# Patient Record
Sex: MALE | Race: WHITE | NOT HISPANIC OR LATINO
[De-identification: names, ages, dates, MRNs, and addresses within clinical notes are randomized per-mention and may not be internally consistent; named-entity substitution may affect disease eponyms.]

---

## 2019-01-28 ENCOUNTER — MEDICATION RENEWAL (OUTPATIENT)
Age: 69
End: 2019-01-28

## 2019-01-29 ENCOUNTER — OTHER (OUTPATIENT)
Age: 69
End: 2019-01-29

## 2019-02-15 ENCOUNTER — RECORD ABSTRACTING (OUTPATIENT)
Age: 69
End: 2019-02-15

## 2019-02-15 DIAGNOSIS — Z82.49 FAMILY HISTORY OF ISCHEMIC HEART DISEASE AND OTHER DISEASES OF THE CIRCULATORY SYSTEM: ICD-10-CM

## 2019-02-15 DIAGNOSIS — Z80.8 FAMILY HISTORY OF MALIGNANT NEOPLASM OF OTHER ORGANS OR SYSTEMS: ICD-10-CM

## 2019-02-15 RX ORDER — OMEGA-3-ACID ETHYL ESTERS CAPSULES 1 G/1
1 CAPSULE, LIQUID FILLED ORAL TWICE DAILY
Refills: 0 | Status: ACTIVE | COMMUNITY

## 2019-04-15 ENCOUNTER — MEDICATION RENEWAL (OUTPATIENT)
Age: 69
End: 2019-04-15

## 2019-08-28 ENCOUNTER — RX RENEWAL (OUTPATIENT)
Age: 69
End: 2019-08-28

## 2019-11-11 ENCOUNTER — OTHER (OUTPATIENT)
Age: 69
End: 2019-11-11

## 2019-11-22 ENCOUNTER — NON-APPOINTMENT (OUTPATIENT)
Age: 69
End: 2019-11-22

## 2019-11-22 ENCOUNTER — APPOINTMENT (OUTPATIENT)
Dept: INTERNAL MEDICINE | Facility: CLINIC | Age: 69
End: 2019-11-22
Payer: MEDICARE

## 2019-11-22 VITALS — BODY MASS INDEX: 27.85 KG/M2 | HEIGHT: 69 IN | WEIGHT: 188 LBS

## 2019-11-22 VITALS — SYSTOLIC BLOOD PRESSURE: 132 MMHG | DIASTOLIC BLOOD PRESSURE: 80 MMHG

## 2019-11-22 DIAGNOSIS — R73.09 OTHER ABNORMAL GLUCOSE: ICD-10-CM

## 2019-11-22 DIAGNOSIS — G25.0 ESSENTIAL TREMOR: ICD-10-CM

## 2019-11-22 LAB
25(OH)D3 SERPL-MCNC: 37.4 NG/ML
ALBUMIN SERPL ELPH-MCNC: 4.6 G/DL
ALP BLD-CCNC: 60 U/L
ALT SERPL-CCNC: 23 U/L
AMYLASE/CREAT SERPL: 65 U/L
ANION GAP SERPL CALC-SCNC: 14 MMOL/L
AST SERPL-CCNC: 27 U/L
BASOPHILS # BLD AUTO: 0.04 K/UL
BASOPHILS NFR BLD AUTO: 0.6 %
BILIRUB SERPL-MCNC: 0.3 MG/DL
BILIRUB UR QL STRIP: NEGATIVE
BUN SERPL-MCNC: 18 MG/DL
CALCIUM SERPL-MCNC: 9.7 MG/DL
CHLORIDE SERPL-SCNC: 104 MMOL/L
CHOLEST SERPL-MCNC: 171 MG/DL
CHOLEST/HDLC SERPL: 3 RATIO
CK SERPL-CCNC: 176 U/L
CO2 SERPL-SCNC: 25 MMOL/L
CREAT SERPL-MCNC: 0.95 MG/DL
EOSINOPHIL # BLD AUTO: 0.16 K/UL
EOSINOPHIL NFR BLD AUTO: 2.6 %
ESTIMATED AVERAGE GLUCOSE: 120 MG/DL
FERRITIN SERPL-MCNC: 410 NG/ML
GLUCOSE SERPL-MCNC: 115 MG/DL
GLUCOSE UR-MCNC: NEGATIVE
HBA1C MFR BLD HPLC: 5.8 %
HCG UR QL: 0.2 EU/DL
HCT VFR BLD CALC: 44.3 %
HDLC SERPL-MCNC: 58 MG/DL
HGB BLD-MCNC: 14.5 G/DL
HGB UR QL STRIP.AUTO: NEGATIVE
IMM GRANULOCYTES NFR BLD AUTO: 0.2 %
KETONES UR-MCNC: NEGATIVE
LDLC SERPL CALC-MCNC: 87 MG/DL
LEUKOCYTE ESTERASE UR QL STRIP: NEGATIVE
LPL SERPL-CCNC: 30 U/L
LYMPHOCYTES # BLD AUTO: 2.4 K/UL
LYMPHOCYTES NFR BLD AUTO: 38.5 %
MAN DIFF?: NORMAL
MCHC RBC-ENTMCNC: 31.7 PG
MCHC RBC-ENTMCNC: 32.7 GM/DL
MCV RBC AUTO: 96.9 FL
MONOCYTES # BLD AUTO: 0.72 K/UL
MONOCYTES NFR BLD AUTO: 11.6 %
NEUTROPHILS # BLD AUTO: 2.9 K/UL
NEUTROPHILS NFR BLD AUTO: 46.5 %
NITRITE UR QL STRIP: NEGATIVE
PH UR STRIP: 5.5
PLATELET # BLD AUTO: 257 K/UL
POTASSIUM SERPL-SCNC: 4.7 MMOL/L
PROT SERPL-MCNC: 6.9 G/DL
PROT UR STRIP-MCNC: NEGATIVE
PSA SERPL-MCNC: 1.31 NG/ML
RBC # BLD: 4.57 M/UL
RBC # FLD: 12.4 %
SODIUM SERPL-SCNC: 143 MMOL/L
SP GR UR STRIP: 1.03
T4 FREE SERPL-MCNC: 1.2 NG/DL
TRIGL SERPL-MCNC: 130 MG/DL
TSH SERPL-ACNC: 1.18 UIU/ML
VIT B12 SERPL-MCNC: 536 PG/ML
WBC # FLD AUTO: 6.23 K/UL

## 2019-11-22 PROCEDURE — G0444 DEPRESSION SCREEN ANNUAL: CPT | Mod: 59

## 2019-11-22 PROCEDURE — 93000 ELECTROCARDIOGRAM COMPLETE: CPT

## 2019-11-22 PROCEDURE — 90662 IIV NO PRSV INCREASED AG IM: CPT

## 2019-11-22 PROCEDURE — G0442 ANNUAL ALCOHOL SCREEN 15 MIN: CPT | Mod: 59

## 2019-11-22 PROCEDURE — 81003 URINALYSIS AUTO W/O SCOPE: CPT | Mod: QW

## 2019-11-22 PROCEDURE — G0439: CPT

## 2019-11-22 PROCEDURE — G0008: CPT

## 2019-11-22 NOTE — ASSESSMENT
[FreeTextEntry1] : athletic doing very well\par prediabetes better\par lipids good\par ferritin almost normal

## 2019-11-22 NOTE — REVIEW OF SYSTEMS
[Nocturia] : nocturia [Negative] : Heme/Lymph [Cough] : cough [Shortness Of Breath] : no shortness of breath [Wheezing] : no wheezing [Dyspnea on Exertion] : not dyspnea on exertion [Dysuria] : no dysuria [Incontinence] : no incontinence [Hesitancy] : no hesitancy [Hematuria] : no hematuria [Impotence] : no impotency [FreeTextEntry9] : hand pain gets injections

## 2019-11-22 NOTE — HEALTH RISK ASSESSMENT
[No] : No [Never (0 pts)] : Never (0 points) [0] : 2) Feeling down, depressed, or hopeless: Not at all (0) [Patient reported colonoscopy was normal] : Patient reported colonoscopy was normal [] : No [Audit-CScore] : 0 [[id number]] : 0 [ColonoscopyDate] : 3/2019

## 2019-11-22 NOTE — PHYSICAL EXAM
[No Acute Distress] : no acute distress [Well Nourished] : well nourished [Well Developed] : well developed [Well-Appearing] : well-appearing [Normal Sclera/Conjunctiva] : normal sclera/conjunctiva [PERRL] : pupils equal round and reactive to light [EOMI] : extraocular movements intact [Normal Outer Ear/Nose] : the outer ears and nose were normal in appearance [Normal Oropharynx] : the oropharynx was normal [No JVD] : no jugular venous distention [No Lymphadenopathy] : no lymphadenopathy [Supple] : supple [Thyroid Normal, No Nodules] : the thyroid was normal and there were no nodules present [No Respiratory Distress] : no respiratory distress  [No Accessory Muscle Use] : no accessory muscle use [Clear to Auscultation] : lungs were clear to auscultation bilaterally [Normal Rate] : normal rate  [Regular Rhythm] : with a regular rhythm [Normal S1, S2] : normal S1 and S2 [No Murmur] : no murmur heard [No Carotid Bruits] : no carotid bruits [No Abdominal Bruit] : a ~M bruit was not heard ~T in the abdomen [No Varicosities] : no varicosities [Pedal Pulses Present] : the pedal pulses are present [No Edema] : there was no peripheral edema [No Palpable Aorta] : no palpable aorta [No Extremity Clubbing/Cyanosis] : no extremity clubbing/cyanosis [Normal Appearance] : normal in appearance [No Nipple Discharge] : no nipple discharge [No Axillary Lymphadenopathy] : no axillary lymphadenopathy [Soft] : abdomen soft [Non Tender] : non-tender [Non-distended] : non-distended [No Masses] : no abdominal mass palpated [No HSM] : no HSM [Normal Bowel Sounds] : normal bowel sounds [Testes Mass (___cm)] : there were no testicular masses [Normal Posterior Cervical Nodes] : no posterior cervical lymphadenopathy [Normal Anterior Cervical Nodes] : no anterior cervical lymphadenopathy [No CVA Tenderness] : no CVA  tenderness [No Spinal Tenderness] : no spinal tenderness [No Joint Swelling] : no joint swelling [Grossly Normal Strength/Tone] : grossly normal strength/tone [No Rash] : no rash [Coordination Grossly Intact] : coordination grossly intact [No Focal Deficits] : no focal deficits [Normal Gait] : normal gait [Deep Tendon Reflexes (DTR)] : deep tendon reflexes were 2+ and symmetric [Normal Affect] : the affect was normal [Normal Insight/Judgement] : insight and judgment were intact

## 2019-11-22 NOTE — HISTORY OF PRESENT ILLNESS
[FreeTextEntry1] : here for complete exam [de-identified] : doing well\par no alcohol\par prediabetes better A1C 5.8\par regular GI visits [facility]\par active, swims, exercises regularly\par

## 2021-01-14 ENCOUNTER — APPOINTMENT (OUTPATIENT)
Dept: INTERNAL MEDICINE | Facility: CLINIC | Age: 71
End: 2021-01-14
Payer: MEDICARE

## 2021-01-14 ENCOUNTER — NON-APPOINTMENT (OUTPATIENT)
Age: 71
End: 2021-01-14

## 2021-01-14 VITALS
HEIGHT: 69 IN | DIASTOLIC BLOOD PRESSURE: 76 MMHG | BODY MASS INDEX: 26.96 KG/M2 | SYSTOLIC BLOOD PRESSURE: 120 MMHG | WEIGHT: 182 LBS

## 2021-01-14 DIAGNOSIS — E78.1 PURE HYPERGLYCERIDEMIA: ICD-10-CM

## 2021-01-14 DIAGNOSIS — Z23 ENCOUNTER FOR IMMUNIZATION: ICD-10-CM

## 2021-01-14 LAB
25(OH)D3 SERPL-MCNC: 37.7 NG/ML
ALBUMIN SERPL ELPH-MCNC: 4.7 G/DL
ALP BLD-CCNC: 61 U/L
ALT SERPL-CCNC: 23 U/L
AMYLASE/CREAT SERPL: 58 U/L
ANION GAP SERPL CALC-SCNC: 13 MMOL/L
AST SERPL-CCNC: 25 U/L
BASOPHILS # BLD AUTO: 0.03 K/UL
BASOPHILS NFR BLD AUTO: 0.5 %
BILIRUB SERPL-MCNC: 0.3 MG/DL
BILIRUB UR QL STRIP: NEGATIVE
BUN SERPL-MCNC: 17 MG/DL
CALCIUM SERPL-MCNC: 9.2 MG/DL
CHLORIDE SERPL-SCNC: 105 MMOL/L
CHOLEST SERPL-MCNC: 160 MG/DL
CK SERPL-CCNC: 181 U/L
CLARITY UR: CLEAR
CO2 SERPL-SCNC: 24 MMOL/L
COLLECTION METHOD: NORMAL
CREAT SERPL-MCNC: 0.85 MG/DL
EOSINOPHIL # BLD AUTO: 0.13 K/UL
EOSINOPHIL NFR BLD AUTO: 2 %
ESTIMATED AVERAGE GLUCOSE: 120 MG/DL
FERRITIN SERPL-MCNC: 356 NG/ML
GLUCOSE SERPL-MCNC: 133 MG/DL
GLUCOSE UR-MCNC: NEGATIVE
HBA1C MFR BLD HPLC: 5.8 %
HCG UR QL: NORMAL EU/DL
HCT VFR BLD CALC: 42.4 %
HDLC SERPL-MCNC: 56 MG/DL
HGB BLD-MCNC: 14 G/DL
HGB UR QL STRIP.AUTO: NEGATIVE
IMM GRANULOCYTES NFR BLD AUTO: 0.3 %
KETONES UR-MCNC: NEGATIVE
LDLC SERPL CALC-MCNC: 89 MG/DL
LEUKOCYTE ESTERASE UR QL STRIP: NORMAL
LPL SERPL-CCNC: 26 U/L
LYMPHOCYTES # BLD AUTO: 2.37 K/UL
LYMPHOCYTES NFR BLD AUTO: 36.4 %
MAN DIFF?: NORMAL
MCHC RBC-ENTMCNC: 30.9 PG
MCHC RBC-ENTMCNC: 33 GM/DL
MCV RBC AUTO: 93.6 FL
MONOCYTES # BLD AUTO: 0.57 K/UL
MONOCYTES NFR BLD AUTO: 8.8 %
NEUTROPHILS # BLD AUTO: 3.39 K/UL
NEUTROPHILS NFR BLD AUTO: 52 %
NITRITE UR QL STRIP: NEGATIVE
NONHDLC SERPL-MCNC: 104 MG/DL
PH UR STRIP: 6
PLATELET # BLD AUTO: 258 K/UL
POTASSIUM SERPL-SCNC: 4.4 MMOL/L
PROT SERPL-MCNC: 6.7 G/DL
PROT UR STRIP-MCNC: NEGATIVE
PSA SERPL-MCNC: 1.04 NG/ML
RBC # BLD: 4.53 M/UL
RBC # FLD: 12.2 %
SODIUM SERPL-SCNC: 141 MMOL/L
SP GR UR STRIP: 1.02
T4 FREE SERPL-MCNC: 1.2 NG/DL
TRIGL SERPL-MCNC: 73 MG/DL
TSH SERPL-ACNC: 1.25 UIU/ML
VIT B12 SERPL-MCNC: 510 PG/ML
WBC # FLD AUTO: 6.51 K/UL

## 2021-01-14 PROCEDURE — G0009: CPT

## 2021-01-14 PROCEDURE — 93000 ELECTROCARDIOGRAM COMPLETE: CPT | Mod: 59

## 2021-01-14 PROCEDURE — 82270 OCCULT BLOOD FECES: CPT

## 2021-01-14 PROCEDURE — 90732 PPSV23 VACC 2 YRS+ SUBQ/IM: CPT

## 2021-01-14 PROCEDURE — 81003 URINALYSIS AUTO W/O SCOPE: CPT | Mod: QW

## 2021-01-14 PROCEDURE — G0444 DEPRESSION SCREEN ANNUAL: CPT | Mod: 59

## 2021-01-14 PROCEDURE — G0439: CPT

## 2021-01-14 NOTE — ASSESSMENT
[FreeTextEntry1] : athletic doing very well\par prediabetes stable 5.8\par lipids very good\par ferritin normal\par LFT's normal

## 2021-01-14 NOTE — PHYSICAL EXAM
[Well Nourished] : well nourished [Well Developed] : well developed [Well-Appearing] : well-appearing [Normal Sclera/Conjunctiva] : normal sclera/conjunctiva [PERRL] : pupils equal round and reactive to light [EOMI] : extraocular movements intact [Normal Outer Ear/Nose] : the outer ears and nose were normal in appearance [Normal Oropharynx] : the oropharynx was normal [No JVD] : no jugular venous distention [No Lymphadenopathy] : no lymphadenopathy [Supple] : supple [Thyroid Normal, No Nodules] : the thyroid was normal and there were no nodules present [No Respiratory Distress] : no respiratory distress  [No Accessory Muscle Use] : no accessory muscle use [Clear to Auscultation] : lungs were clear to auscultation bilaterally [Normal Rate] : normal rate  [Regular Rhythm] : with a regular rhythm [Normal S1, S2] : normal S1 and S2 [No Murmur] : no murmur heard [No Carotid Bruits] : no carotid bruits [No Abdominal Bruit] : a ~M bruit was not heard ~T in the abdomen [No Varicosities] : no varicosities [Pedal Pulses Present] : the pedal pulses are present [No Edema] : there was no peripheral edema [No Palpable Aorta] : no palpable aorta [No Extremity Clubbing/Cyanosis] : no extremity clubbing/cyanosis [Normal Appearance] : normal in appearance [No Nipple Discharge] : no nipple discharge [No Axillary Lymphadenopathy] : no axillary lymphadenopathy [Soft] : abdomen soft [Non Tender] : non-tender [Non-distended] : non-distended [No Masses] : no abdominal mass palpated [No HSM] : no HSM [Normal Bowel Sounds] : normal bowel sounds [Testes Mass (___cm)] : there were no testicular masses [Normal Posterior Cervical Nodes] : no posterior cervical lymphadenopathy [Normal Anterior Cervical Nodes] : no anterior cervical lymphadenopathy [No CVA Tenderness] : no CVA  tenderness [No Spinal Tenderness] : no spinal tenderness [No Joint Swelling] : no joint swelling [Grossly Normal Strength/Tone] : grossly normal strength/tone [No Rash] : no rash [Coordination Grossly Intact] : coordination grossly intact [No Focal Deficits] : no focal deficits [Normal Gait] : normal gait [Deep Tendon Reflexes (DTR)] : deep tendon reflexes were 2+ and symmetric [Normal Affect] : the affect was normal [Normal Insight/Judgement] : insight and judgment were intact [No Mass] : no mass [Stool Occult Blood] : stool negative for occult blood

## 2021-01-14 NOTE — REVIEW OF SYSTEMS
[Nocturia] : nocturia [Negative] : Heme/Lymph [Shortness Of Breath] : no shortness of breath [Wheezing] : no wheezing [Cough] : no cough [Dyspnea on Exertion] : not dyspnea on exertion [Dysuria] : no dysuria [Incontinence] : no incontinence [Hesitancy] : no hesitancy [Hematuria] : no hematuria [Impotence] : no impotency [FreeTextEntry8] : stable [FreeTextEntry9] : hand pain gets injections

## 2021-01-14 NOTE — HEALTH RISK ASSESSMENT
[No] : No [Never (0 pts)] : Never (0 points) [0] : 2) Feeling down, depressed, or hopeless: Not at all (0) [Patient reported colonoscopy was normal] : Patient reported colonoscopy was normal [] : No [Audit-CScore] : 0 [[id number]] : 0 [ColonoscopyDate] : 3/2019 [ColonoscopyComments] : [facility]

## 2021-01-14 NOTE — HISTORY OF PRESENT ILLNESS
[FreeTextEntry1] : here for complete exam [de-identified] : doing well\par no alcohol\par prediabetes stable A1C 5.8\par regular GI visits [facility]\par cervical disc episode better after epidural\par active, still swims, exercises regularly without issues\par regular derm, recent squamous cell scalp removed, [facility]\par

## 2021-05-03 ENCOUNTER — APPOINTMENT (OUTPATIENT)
Dept: INTERNAL MEDICINE | Facility: CLINIC | Age: 71
End: 2021-05-03
Payer: MEDICARE

## 2021-05-03 ENCOUNTER — NON-APPOINTMENT (OUTPATIENT)
Age: 71
End: 2021-05-03

## 2021-05-03 VITALS — BODY MASS INDEX: 27.47 KG/M2 | WEIGHT: 186 LBS

## 2021-05-03 VITALS — HEIGHT: 69 IN | BODY MASS INDEX: 27.47 KG/M2 | SYSTOLIC BLOOD PRESSURE: 130 MMHG | DIASTOLIC BLOOD PRESSURE: 78 MMHG

## 2021-05-03 DIAGNOSIS — Z01.818 ENCOUNTER FOR OTHER PREPROCEDURAL EXAMINATION: ICD-10-CM

## 2021-05-03 DIAGNOSIS — H26.9 UNSPECIFIED CATARACT: ICD-10-CM

## 2021-05-03 PROCEDURE — 93000 ELECTROCARDIOGRAM COMPLETE: CPT

## 2021-05-03 PROCEDURE — 99213 OFFICE O/P EST LOW 20 MIN: CPT | Mod: 25

## 2021-05-03 NOTE — PHYSICAL EXAM
[Well Nourished] : well nourished [Well Developed] : well developed [Well-Appearing] : well-appearing [Normal Sclera/Conjunctiva] : normal sclera/conjunctiva [PERRL] : pupils equal round and reactive to light [EOMI] : extraocular movements intact [Normal Outer Ear/Nose] : the outer ears and nose were normal in appearance [Normal Oropharynx] : the oropharynx was normal [No JVD] : no jugular venous distention [No Lymphadenopathy] : no lymphadenopathy [Supple] : supple [Thyroid Normal, No Nodules] : the thyroid was normal and there were no nodules present [No Respiratory Distress] : no respiratory distress  [No Accessory Muscle Use] : no accessory muscle use [Clear to Auscultation] : lungs were clear to auscultation bilaterally [Normal Rate] : normal rate  [Regular Rhythm] : with a regular rhythm [Normal S1, S2] : normal S1 and S2 [No Murmur] : no murmur heard [No Carotid Bruits] : no carotid bruits [No Abdominal Bruit] : a ~M bruit was not heard ~T in the abdomen [No Varicosities] : no varicosities [Pedal Pulses Present] : the pedal pulses are present [No Edema] : there was no peripheral edema [No Palpable Aorta] : no palpable aorta [No Extremity Clubbing/Cyanosis] : no extremity clubbing/cyanosis [Normal Appearance] : normal in appearance [No Nipple Discharge] : no nipple discharge [No Axillary Lymphadenopathy] : no axillary lymphadenopathy [Soft] : abdomen soft [Non Tender] : non-tender [Non-distended] : non-distended [No Masses] : no abdominal mass palpated [No HSM] : no HSM [Normal Bowel Sounds] : normal bowel sounds [Normal Posterior Cervical Nodes] : no posterior cervical lymphadenopathy [Normal Anterior Cervical Nodes] : no anterior cervical lymphadenopathy [No CVA Tenderness] : no CVA  tenderness [No Spinal Tenderness] : no spinal tenderness [No Joint Swelling] : no joint swelling [Grossly Normal Strength/Tone] : grossly normal strength/tone [No Rash] : no rash [Coordination Grossly Intact] : coordination grossly intact [No Focal Deficits] : no focal deficits [Normal Gait] : normal gait [Normal Affect] : the affect was normal [Normal Insight/Judgement] : insight and judgment were intact

## 2021-05-03 NOTE — HISTORY OF PRESENT ILLNESS
[No Adverse Anesthesia Reaction] : no adverse anesthesia reaction in self or family member [(Patient denies any chest pain, claudication, dyspnea on exertion, orthopnea, palpitations or syncope)] : Patient denies any chest pain, claudication, dyspnea on exertion, orthopnea, palpitations or syncope [Excellent (>10 METs)] : Excellent (>10 METs) [Aortic Stenosis] : no aortic stenosis [Atrial Fibrillation] : no atrial fibrillation [Coronary Artery Disease] : no coronary artery disease [Recent Myocardial Infarction] : no recent myocardial infarction [Implantable Device/Pacemaker] : no implantable device/pacemaker [Asthma] : no asthma [COPD] : no COPD [Sleep Apnea] : no sleep apnea [Smoker] : not a smoker [Chronic Anticoagulation] : no chronic anticoagulation [Chronic Kidney Disease] : no chronic kidney disease [Diabetes] : no diabetes [FreeTextEntry1] : cataract extraction and treatment of astigmatism and insertion of IOL [FreeTextEntry2] : 5/20/2021 [FreeTextEntry3] : Kirill Cardenas [FreeTextEntry4] : borderline prediabetes\par cervical disc degeneration\par visual problems right eye from cataract

## 2021-05-03 NOTE — REVIEW OF SYSTEMS
[Nocturia] : nocturia [Negative] : Heme/Lymph [Shortness Of Breath] : no shortness of breath [Wheezing] : no wheezing [Cough] : no cough [Dyspnea on Exertion] : not dyspnea on exertion [Dysuria] : no dysuria [Incontinence] : no incontinence [Hesitancy] : no hesitancy [Hematuria] : no hematuria [Impotence] : no impotency [FreeTextEntry8] : stable

## 2021-05-03 NOTE — ASSESSMENT
[Patient Optimized for Surgery] : Patient optimized for surgery [No Further Testing Recommended] : no further testing recommended [Continue medications as is] : Continue current medications [FreeTextEntry4] : stable for low risk surgery and anesthesia at very low risk

## 2021-05-16 ENCOUNTER — TRANSCRIPTION ENCOUNTER (OUTPATIENT)
Age: 71
End: 2021-05-16

## 2021-10-18 ENCOUNTER — APPOINTMENT (OUTPATIENT)
Dept: UROLOGY | Facility: CLINIC | Age: 71
End: 2021-10-18
Payer: MEDICARE

## 2021-10-18 VITALS
BODY MASS INDEX: 26.66 KG/M2 | DIASTOLIC BLOOD PRESSURE: 76 MMHG | RESPIRATION RATE: 16 BRPM | HEART RATE: 54 BPM | HEIGHT: 69 IN | TEMPERATURE: 97.5 F | WEIGHT: 180 LBS | SYSTOLIC BLOOD PRESSURE: 132 MMHG

## 2021-10-18 PROCEDURE — 99203 OFFICE O/P NEW LOW 30 MIN: CPT

## 2021-10-18 NOTE — PHYSICAL EXAM
[General Appearance - Well Developed] : well developed [General Appearance - Well Nourished] : well nourished [Heart Rate And Rhythm] : Heart rate and rhythm were normal [] : no respiratory distress [Respiration, Rhythm And Depth] : normal respiratory rhythm and effort [Bowel Sounds] : normal bowel sounds [Abdomen Soft] : soft [Urethral Meatus] : meatus normal [Normal Station and Gait] : the gait and station were normal for the patient's age [Skin Color & Pigmentation] : normal skin color and pigmentation [Skin Turgor] : supple [No Focal Deficits] : no focal deficits [Oriented To Time, Place, And Person] : oriented to person, place, and time [Not Anxious] : not anxious

## 2021-10-18 NOTE — HISTORY OF PRESENT ILLNESS
[FreeTextEntry1] : HPI: Mr. Esquivel is a 71 yo M here today with a gross hematuria x1 after Saturday. No weight loss. Smokes marijuana every day for the last 50 years. No back pain. Frequent swimmer, in good physical shape. No urinary complaints, occasionally having some urgency. Nocturia x 1. Good erections.\par \par Anticoagulation: No AC\par All: NKDA\par Social: MJ smoker, , children\par PMHx: no HTN, DM or HLD\par FHx: father was prior smoker\par PSHx: torn meniscus, trigger finger, epidurals for neck and lower back\par Labs: PSA 1.04 1/2021\par \par \par  [Urinary Incontinence] : no urinary incontinence [Urinary Retention] : no urinary retention [Urinary Urgency] : no urinary urgency [Urinary Frequency] : no urinary frequency

## 2021-10-19 LAB
APPEARANCE: CLEAR
BACTERIA: NEGATIVE
BILIRUBIN URINE: NEGATIVE
BLOOD URINE: NEGATIVE
COLOR: NORMAL
GLUCOSE QUALITATIVE U: NEGATIVE
HYALINE CASTS: 0 /LPF
KETONES URINE: NEGATIVE
LEUKOCYTE ESTERASE URINE: NEGATIVE
MICROSCOPIC-UA: NORMAL
NITRITE URINE: NEGATIVE
PH URINE: 6.5
PROTEIN URINE: NEGATIVE
RED BLOOD CELLS URINE: 0 /HPF
SPECIFIC GRAVITY URINE: 1.01
SQUAMOUS EPITHELIAL CELLS: 0 /HPF
UROBILINOGEN URINE: NORMAL
WHITE BLOOD CELLS URINE: 0 /HPF

## 2021-10-20 LAB
BACTERIA UR CULT: NORMAL
URINE CYTOLOGY: NORMAL

## 2021-10-26 ENCOUNTER — TRANSCRIPTION ENCOUNTER (OUTPATIENT)
Age: 71
End: 2021-10-26

## 2021-10-27 ENCOUNTER — APPOINTMENT (OUTPATIENT)
Dept: CT IMAGING | Facility: CLINIC | Age: 71
End: 2021-10-27
Payer: MEDICARE

## 2021-10-27 ENCOUNTER — OUTPATIENT (OUTPATIENT)
Dept: OUTPATIENT SERVICES | Facility: HOSPITAL | Age: 71
LOS: 1 days | End: 2021-10-27
Payer: MEDICARE

## 2021-10-27 DIAGNOSIS — R31.0 GROSS HEMATURIA: ICD-10-CM

## 2021-10-27 PROCEDURE — G1004: CPT

## 2021-10-27 PROCEDURE — 74178 CT ABD&PLV WO CNTR FLWD CNTR: CPT | Mod: 26,MG

## 2021-10-27 PROCEDURE — 74178 CT ABD&PLV WO CNTR FLWD CNTR: CPT | Mod: MG

## 2021-11-02 ENCOUNTER — OUTPATIENT (OUTPATIENT)
Dept: OUTPATIENT SERVICES | Facility: HOSPITAL | Age: 71
LOS: 1 days | End: 2021-11-02
Payer: MEDICARE

## 2021-11-02 ENCOUNTER — APPOINTMENT (OUTPATIENT)
Dept: UROLOGY | Facility: CLINIC | Age: 71
End: 2021-11-02
Payer: MEDICARE

## 2021-11-02 VITALS
SYSTOLIC BLOOD PRESSURE: 169 MMHG | HEART RATE: 53 BPM | TEMPERATURE: 98 F | DIASTOLIC BLOOD PRESSURE: 82 MMHG | RESPIRATION RATE: 17 BRPM

## 2021-11-02 DIAGNOSIS — Z87.448 PERSONAL HISTORY OF OTHER DISEASES OF URINARY SYSTEM: ICD-10-CM

## 2021-11-02 DIAGNOSIS — R35.0 FREQUENCY OF MICTURITION: ICD-10-CM

## 2021-11-02 PROCEDURE — 52000 CYSTOURETHROSCOPY: CPT

## 2022-01-18 ENCOUNTER — APPOINTMENT (OUTPATIENT)
Dept: UROLOGY | Facility: CLINIC | Age: 72
End: 2022-01-18
Payer: MEDICARE

## 2022-01-18 DIAGNOSIS — R35.0 FREQUENCY OF MICTURITION: ICD-10-CM

## 2022-01-18 PROCEDURE — 99212 OFFICE O/P EST SF 10 MIN: CPT

## 2022-01-18 PROCEDURE — 88112 CYTOPATH CELL ENHANCE TECH: CPT | Mod: 26

## 2022-01-18 NOTE — PHYSICAL EXAM
[General Appearance - Well Developed] : well developed [General Appearance - Well Nourished] : well nourished [Bowel Sounds] : normal bowel sounds [Abdomen Soft] : soft [Urethral Meatus] : meatus normal [Skin Color & Pigmentation] : normal skin color and pigmentation [Skin Turgor] : supple [Heart Rate And Rhythm] : Heart rate and rhythm were normal [] : no respiratory distress [Respiration, Rhythm And Depth] : normal respiratory rhythm and effort [Oriented To Time, Place, And Person] : oriented to person, place, and time [Not Anxious] : not anxious [Normal Station and Gait] : the gait and station were normal for the patient's age [No Focal Deficits] : no focal deficits

## 2022-01-23 ENCOUNTER — TRANSCRIPTION ENCOUNTER (OUTPATIENT)
Age: 72
End: 2022-01-23

## 2022-01-23 LAB
APPEARANCE: CLEAR
BACTERIA: NEGATIVE
BILIRUBIN URINE: NEGATIVE
BLOOD URINE: NEGATIVE
COLOR: NORMAL
GLUCOSE QUALITATIVE U: ABNORMAL
HYALINE CASTS: 0 /LPF
KETONES URINE: NEGATIVE
LEUKOCYTE ESTERASE URINE: NEGATIVE
MICROSCOPIC-UA: NORMAL
NITRITE URINE: NEGATIVE
PH URINE: 6.5
PROTEIN URINE: NEGATIVE
RED BLOOD CELLS URINE: 0 /HPF
SPECIFIC GRAVITY URINE: 1.01
SQUAMOUS EPITHELIAL CELLS: 0 /HPF
URINE CYTOLOGY: NORMAL
UROBILINOGEN URINE: NORMAL
WHITE BLOOD CELLS URINE: 1 /HPF

## 2022-01-23 NOTE — HISTORY OF PRESENT ILLNESS
[Urinary Frequency] : urinary frequency [Weak Stream] : weak stream [FreeTextEntry1] : HPI: Mr. Esquivel is a 71 yo M here today with a gross hematuria x1 after Saturday. No weight loss. Smokes marijuana every day for the last 50 years. No back pain. Frequent swimmer, in good physical shape. No urinary complaints, occasionally having some urgency. Nocturia x 1. Good erections.\par \par Anticoagulation: No AC\par All: NKDA\par Social: MJ smoker, , children\par PMHx: no HTN, DM or HLD\par FHx: father was prior smoker\par PSHx: torn meniscus, trigger finger, epidurals for neck and lower back\par Labs: PSA 1.04 1/2021\par \par 1/18:\par Here today after negative hematuria workup. Cystoscopy with trilobar hyperplasia. CTU with punctate nonobstructing stones. PVR today 86 cc. Still has frequency from time to time but does have some valsava voiding with pushing. PSA screening with PCP Dr. Yang.\par \par \par  [Urinary Incontinence] : no urinary incontinence [Urinary Retention] : no urinary retention [Urinary Urgency] : no urinary urgency

## 2022-02-02 ENCOUNTER — APPOINTMENT (OUTPATIENT)
Dept: UROLOGY | Facility: CLINIC | Age: 72
End: 2022-02-02

## 2022-02-15 ENCOUNTER — APPOINTMENT (OUTPATIENT)
Dept: UROLOGY | Facility: CLINIC | Age: 72
End: 2022-02-15
Payer: MEDICARE

## 2022-02-15 PROCEDURE — 99212 OFFICE O/P EST SF 10 MIN: CPT

## 2022-02-15 PROCEDURE — 51798 US URINE CAPACITY MEASURE: CPT

## 2022-02-15 NOTE — HISTORY OF PRESENT ILLNESS
[FreeTextEntry1] : HPI: Mr. Esquivel is a 71 yo M here today with a gross hematuria x1 after Saturday. No weight loss. Smokes marijuana every day for the last 50 years. No back pain. Frequent swimmer, in good physical shape. No urinary complaints, occasionally having some urgency. Nocturia x 1. Good erections.\par \par Anticoagulation: No AC\par All: NKDA\par Social: MJ smoker, , children\par PMHx: no HTN, DM or HLD\par FHx: father was prior smoker\par PSHx: torn meniscus, trigger finger, epidurals for neck and lower back\par Labs: PSA 1.04 1/2021\par \par 1/18:\par Here today after negative hematuria workup. Cystoscopy with trilobar hyperplasia. CTU with punctate nonobstructing stones. PVR today 86 cc. Still has frequency from time to time but does have some valsava voiding with pushing. PSA screening with PCP Dr. Yang.\par \par 2/15:\par Here today with flomax, doing well, PVR today 34 cc. CTU with nonoobstructing stones and enlarged prostate. PSA to be done with PCP. Doing well, emptying well with strong stream. \par \par  [Urinary Incontinence] : no urinary incontinence [Urinary Retention] : no urinary retention [Urinary Urgency] : no urinary urgency [Urinary Frequency] : no urinary frequency [Weak Stream] : no weak stream

## 2022-03-09 ENCOUNTER — NON-APPOINTMENT (OUTPATIENT)
Age: 72
End: 2022-03-09

## 2022-03-09 ENCOUNTER — APPOINTMENT (OUTPATIENT)
Dept: INTERNAL MEDICINE | Facility: CLINIC | Age: 72
End: 2022-03-09
Payer: MEDICARE

## 2022-03-09 VITALS
TEMPERATURE: 97.3 F | SYSTOLIC BLOOD PRESSURE: 126 MMHG | BODY MASS INDEX: 27.99 KG/M2 | WEIGHT: 189 LBS | HEIGHT: 69 IN | HEART RATE: 59 BPM | DIASTOLIC BLOOD PRESSURE: 70 MMHG | OXYGEN SATURATION: 99 %

## 2022-03-09 VITALS — SYSTOLIC BLOOD PRESSURE: 132 MMHG | DIASTOLIC BLOOD PRESSURE: 80 MMHG

## 2022-03-09 DIAGNOSIS — R79.89 OTHER SPECIFIED ABNORMAL FINDINGS OF BLOOD CHEMISTRY: ICD-10-CM

## 2022-03-09 DIAGNOSIS — R05.3 CHRONIC COUGH: ICD-10-CM

## 2022-03-09 DIAGNOSIS — R31.0 GROSS HEMATURIA: ICD-10-CM

## 2022-03-09 DIAGNOSIS — K76.0 FATTY (CHANGE OF) LIVER, NOT ELSEWHERE CLASSIFIED: ICD-10-CM

## 2022-03-09 LAB
ALBUMIN SERPL ELPH-MCNC: 4.5 G/DL
ALP BLD-CCNC: 55 U/L
ALT SERPL-CCNC: 22 U/L
AMYLASE/CREAT SERPL: 52 U/L
ANION GAP SERPL CALC-SCNC: 12 MMOL/L
AST SERPL-CCNC: 23 U/L
BASOPHILS # BLD AUTO: 0.03 K/UL
BASOPHILS NFR BLD AUTO: 0.5 %
BILIRUB SERPL-MCNC: 0.3 MG/DL
BUN SERPL-MCNC: 13 MG/DL
CALCIUM SERPL-MCNC: 9.1 MG/DL
CHLORIDE SERPL-SCNC: 105 MMOL/L
CHOLEST SERPL-MCNC: 158 MG/DL
CK SERPL-CCNC: 188 U/L
CO2 SERPL-SCNC: 24 MMOL/L
CREAT SERPL-MCNC: 0.82 MG/DL
EGFR: 94 ML/MIN/1.73M2
EOSINOPHIL # BLD AUTO: 0.15 K/UL
EOSINOPHIL NFR BLD AUTO: 2.4 %
ESTIMATED AVERAGE GLUCOSE: 123 MG/DL
GLUCOSE SERPL-MCNC: 121 MG/DL
HBA1C MFR BLD HPLC: 5.9 %
HCT VFR BLD CALC: 41.5 %
HDLC SERPL-MCNC: 55 MG/DL
HGB BLD-MCNC: 13.6 G/DL
IMM GRANULOCYTES NFR BLD AUTO: 0.3 %
LDLC SERPL CALC-MCNC: 89 MG/DL
LPL SERPL-CCNC: 27 U/L
LYMPHOCYTES # BLD AUTO: 2.59 K/UL
LYMPHOCYTES NFR BLD AUTO: 41.1 %
MAN DIFF?: NORMAL
MCHC RBC-ENTMCNC: 31.5 PG
MCHC RBC-ENTMCNC: 32.8 GM/DL
MCV RBC AUTO: 96.1 FL
MONOCYTES # BLD AUTO: 0.55 K/UL
MONOCYTES NFR BLD AUTO: 8.7 %
NEUTROPHILS # BLD AUTO: 2.96 K/UL
NEUTROPHILS NFR BLD AUTO: 47 %
NONHDLC SERPL-MCNC: 103 MG/DL
PLATELET # BLD AUTO: 242 K/UL
POTASSIUM SERPL-SCNC: 4.7 MMOL/L
PROT SERPL-MCNC: 6.6 G/DL
PSA SERPL-MCNC: 0.93 NG/ML
RBC # BLD: 4.32 M/UL
RBC # FLD: 12.4 %
SODIUM SERPL-SCNC: 141 MMOL/L
T4 FREE SERPL-MCNC: 1.4 NG/DL
TRIGL SERPL-MCNC: 68 MG/DL
TSH SERPL-ACNC: 1.18 UIU/ML
VIT B12 SERPL-MCNC: 526 PG/ML
WBC # FLD AUTO: 6.3 K/UL

## 2022-03-09 PROCEDURE — G0444 DEPRESSION SCREEN ANNUAL: CPT

## 2022-03-09 PROCEDURE — 81003 URINALYSIS AUTO W/O SCOPE: CPT | Mod: QW

## 2022-03-09 PROCEDURE — 36415 COLL VENOUS BLD VENIPUNCTURE: CPT

## 2022-03-09 PROCEDURE — 93000 ELECTROCARDIOGRAM COMPLETE: CPT | Mod: 59

## 2022-03-09 PROCEDURE — G0439: CPT

## 2022-03-09 NOTE — REVIEW OF SYSTEMS
[Nocturia] : nocturia [Negative] : Heme/Lymph [Cough] : cough [Shortness Of Breath] : no shortness of breath [Wheezing] : no wheezing [Dyspnea on Exertion] : not dyspnea on exertion [Dysuria] : no dysuria [Incontinence] : no incontinence [Hesitancy] : no hesitancy [Hematuria] : no hematuria [Impotence] : no impotency [FreeTextEntry6] : on and off [FreeTextEntry8] : stable

## 2022-03-09 NOTE — PHYSICAL EXAM
[Well Nourished] : well nourished [Well Developed] : well developed [Well-Appearing] : well-appearing [Normal Sclera/Conjunctiva] : normal sclera/conjunctiva [PERRL] : pupils equal round and reactive to light [EOMI] : extraocular movements intact [Normal Outer Ear/Nose] : the outer ears and nose were normal in appearance [Normal Oropharynx] : the oropharynx was normal [No JVD] : no jugular venous distention [No Lymphadenopathy] : no lymphadenopathy [Supple] : supple [Thyroid Normal, No Nodules] : the thyroid was normal and there were no nodules present [No Respiratory Distress] : no respiratory distress  [No Accessory Muscle Use] : no accessory muscle use [Clear to Auscultation] : lungs were clear to auscultation bilaterally [Normal Rate] : normal rate  [Regular Rhythm] : with a regular rhythm [Normal S1, S2] : normal S1 and S2 [No Murmur] : no murmur heard [No Carotid Bruits] : no carotid bruits [No Abdominal Bruit] : a ~M bruit was not heard ~T in the abdomen [No Varicosities] : no varicosities [Pedal Pulses Present] : the pedal pulses are present [No Edema] : there was no peripheral edema [No Palpable Aorta] : no palpable aorta [No Extremity Clubbing/Cyanosis] : no extremity clubbing/cyanosis [Normal Appearance] : normal in appearance [No Nipple Discharge] : no nipple discharge [No Axillary Lymphadenopathy] : no axillary lymphadenopathy [Soft] : abdomen soft [Non Tender] : non-tender [Non-distended] : non-distended [No Masses] : no abdominal mass palpated [No HSM] : no HSM [Normal Bowel Sounds] : normal bowel sounds [Normal Posterior Cervical Nodes] : no posterior cervical lymphadenopathy [Normal Anterior Cervical Nodes] : no anterior cervical lymphadenopathy [No CVA Tenderness] : no CVA  tenderness [No Spinal Tenderness] : no spinal tenderness [No Joint Swelling] : no joint swelling [Grossly Normal Strength/Tone] : grossly normal strength/tone [No Rash] : no rash [Coordination Grossly Intact] : coordination grossly intact [No Focal Deficits] : no focal deficits [Normal Gait] : normal gait [Normal Affect] : the affect was normal [Normal Insight/Judgement] : insight and judgment were intact [No Stool to Guaiac] : no stool to guaiac [Normal Sphincter Tone] : normal sphincter tone [No Mass] : no mass [Testes Mass (___cm)] : there were no testicular masses [No Prostate Nodules] : no prostate nodules [Stool Occult Blood] : stool negative for occult blood [de-identified] : cerumen impaction left

## 2022-03-09 NOTE — HISTORY OF PRESENT ILLNESS
[FreeTextEntry1] : here for complete exam [de-identified] : doing well\par no alcohol\par prediabetes stable A1C 5.9\par regular GI visits [facility]\par cervical disc episode better after epidural\par also had lower back epidurals\par active, still swims, exercises regularly without issues\par no issues with exercise\par regular derm [facility]\par sees urologist Jacky Zamora after gross hematuria\par vaccinated and boosted Pfizer\par PVR 86, after tamsulosin went down to 32\par CT coronary calcium score 34 lungs were ok 2/2021

## 2022-03-09 NOTE — HEALTH RISK ASSESSMENT
[Never] : Never [No] : No [Never (0 pts)] : Never (0 points) [0] : 2) Feeling down, depressed, or hopeless: Not at all (0) [PHQ-2 Negative - No further assessment needed] : PHQ-2 Negative - No further assessment needed [Patient reported colonoscopy was normal] : Patient reported colonoscopy was normal [Audit-CScore] : 0 [[id number]] : 0 [ColonoscopyDate] : 3/2019 [ColonoscopyComments] : [facility]

## 2022-03-09 NOTE — ASSESSMENT
[FreeTextEntry1] : athletic doing very well\par prediabetes stable 5.9\par lipids very good\par LFT's normal\par persistent mild cough

## 2022-03-17 ENCOUNTER — TRANSCRIPTION ENCOUNTER (OUTPATIENT)
Age: 72
End: 2022-03-17

## 2022-10-18 ENCOUNTER — APPOINTMENT (OUTPATIENT)
Dept: ORTHOPEDIC SURGERY | Facility: CLINIC | Age: 72
End: 2022-10-18

## 2022-10-18 VITALS — HEIGHT: 69 IN | BODY MASS INDEX: 27.4 KG/M2 | WEIGHT: 185 LBS

## 2022-10-18 DIAGNOSIS — M25.521 PAIN IN RIGHT ELBOW: ICD-10-CM

## 2022-10-18 DIAGNOSIS — M25.729 OSTEOPHYTE, UNSPECIFIED ELBOW: ICD-10-CM

## 2022-10-18 PROCEDURE — 73080 X-RAY EXAM OF ELBOW: CPT | Mod: RT

## 2022-10-18 PROCEDURE — 99213 OFFICE O/P EST LOW 20 MIN: CPT

## 2022-10-18 NOTE — PHYSICAL EXAM
[NL (150)] : flexion 150 degrees [NL (0)] : extension 0 degrees [NL (90)] : supination 90 degrees [5___] : supination 5[unfilled]/5 [] : light touch intact [Right] : right elbow [Degenerative change] : Degenerative change

## 2022-10-18 NOTE — DISCUSSION/SUMMARY
[Medication Risks Reviewed] : Medication risks reviewed [Surgical risks reviewed] : Surgical risks reviewed [de-identified] : elbow appears great and patient has been going exercies on his own. encouraged him to continue\par prescribed nsaids and discussed risks of side effects and timing and management of medication.  side effects can include gi ulcers and irritation as well as kidney failure and bleeding issues\par

## 2022-10-18 NOTE — HISTORY OF PRESENT ILLNESS
[de-identified] : patient was getting out of bed tow weeks ago  and used the  right elbow  as support . pt had bursitis in  the  right elbow. pt  feels tenderness in the right elbow. pt feels discomfort in the right elbow. pt had the  the right elbow drain on 11/03/2019  and had steroid injection in the right elbow.

## 2023-01-30 ENCOUNTER — TRANSCRIPTION ENCOUNTER (OUTPATIENT)
Age: 73
End: 2023-01-30

## 2023-02-16 ENCOUNTER — APPOINTMENT (OUTPATIENT)
Dept: UROLOGY | Facility: CLINIC | Age: 73
End: 2023-02-16
Payer: MEDICARE

## 2023-02-16 VITALS
HEART RATE: 69 BPM | BODY MASS INDEX: 25.18 KG/M2 | TEMPERATURE: 97.7 F | HEIGHT: 69 IN | DIASTOLIC BLOOD PRESSURE: 78 MMHG | SYSTOLIC BLOOD PRESSURE: 143 MMHG | WEIGHT: 170 LBS | RESPIRATION RATE: 17 BRPM

## 2023-02-16 PROCEDURE — 99212 OFFICE O/P EST SF 10 MIN: CPT

## 2023-02-16 PROCEDURE — 51798 US URINE CAPACITY MEASURE: CPT

## 2023-02-16 NOTE — HISTORY OF PRESENT ILLNESS
[FreeTextEntry1] : HPI: Mr. Esquivel is a 69 yo M here today with a gross hematuria x1 after Saturday. No weight loss. Smokes marijuana every day for the last 50 years. No back pain. Frequent swimmer, in good physical shape. No urinary complaints, occasionally having some urgency. Nocturia x 1. Good erections.\par \par Anticoagulation: No AC\par All: NKDA\par Social: MJ smoker, , children\par PMHx: no HTN, DM or HLD\par FHx: father was prior smoker\par PSHx: torn meniscus, trigger finger, epidurals for neck and lower back\par Labs: PSA 1.04 1/2021\par \par 1/18:\par Here today after negative hematuria workup. Cystoscopy with trilobar hyperplasia. CTU with punctate nonobstructing stones. PVR today 86 cc. Still has frequency from time to time but does have some valsava voiding with pushing. PSA screening with PCP Dr. Yang.\par \par 2/16:\par Here today with flomax, doing well, PVR today 20 cc. CTU with nonoobstructing stones and enlarged prostate. No symptoms of the kidney stones. PSA to be done with PCP. Doing well, emptying well with strong stream. Will avoid the finasteride at this time. PSA with 0.93 \par  [Urinary Incontinence] : no urinary incontinence [Urinary Retention] : no urinary retention [Urinary Urgency] : no urinary urgency [Urinary Frequency] : no urinary frequency [Weak Stream] : no weak stream

## 2023-04-11 ENCOUNTER — TRANSCRIPTION ENCOUNTER (OUTPATIENT)
Age: 73
End: 2023-04-11

## 2023-04-25 ENCOUNTER — APPOINTMENT (OUTPATIENT)
Dept: INTERNAL MEDICINE | Facility: CLINIC | Age: 73
End: 2023-04-25
Payer: MEDICARE

## 2023-04-25 ENCOUNTER — NON-APPOINTMENT (OUTPATIENT)
Age: 73
End: 2023-04-25

## 2023-04-25 VITALS
TEMPERATURE: 97.8 F | DIASTOLIC BLOOD PRESSURE: 69 MMHG | SYSTOLIC BLOOD PRESSURE: 129 MMHG | WEIGHT: 167 LBS | HEART RATE: 58 BPM | HEIGHT: 69 IN | OXYGEN SATURATION: 100 % | BODY MASS INDEX: 24.73 KG/M2

## 2023-04-25 VITALS — SYSTOLIC BLOOD PRESSURE: 125 MMHG | DIASTOLIC BLOOD PRESSURE: 74 MMHG

## 2023-04-25 PROCEDURE — G0439: CPT

## 2023-04-25 PROCEDURE — 93000 ELECTROCARDIOGRAM COMPLETE: CPT

## 2023-04-25 NOTE — ASSESSMENT
[FreeTextEntry1] : athletic doing very well\par prediabetes better\par lipids very good\par LFT's normal\par mild increase lipase\par

## 2023-04-25 NOTE — HEALTH RISK ASSESSMENT
[No] : No [Never (0 pts)] : Never (0 points) [0] : 2) Feeling down, depressed, or hopeless: Not at all (0) [PHQ-2 Negative - No further assessment needed] : PHQ-2 Negative - No further assessment needed [Patient reported colonoscopy was normal] : Patient reported colonoscopy was normal [No falls in past year] : Patient reported no falls in the past year [Never] : Never [> 15 Years] : > 15 Years [Audit-CScore] : 0 [[id number]] : 0 [ColonoscopyDate] : 3/2019 [ColonoscopyComments] : [facility], past colon polyp

## 2023-04-25 NOTE — HISTORY OF PRESENT ILLNESS
[FreeTextEntry1] : here for complete exam [de-identified] : doing well\par no alcohol\par prediabetes better\par regular GI visits [facility] retired, \par cervical disc episode better after epidural\par also had lower back epidurals\par active, still swims, exercises regularly without issues\par no issues with exercise\par regular derm [facility]\par sees urologist Jacky Zamora nephrolithiasis\par CT coronary calcium score 34 lungs were ok 2/2021

## 2023-04-25 NOTE — PHYSICAL EXAM
[Well Nourished] : well nourished [Well Developed] : well developed [Well-Appearing] : well-appearing [Normal Sclera/Conjunctiva] : normal sclera/conjunctiva [PERRL] : pupils equal round and reactive to light [EOMI] : extraocular movements intact [Normal Outer Ear/Nose] : the outer ears and nose were normal in appearance [Normal Oropharynx] : the oropharynx was normal [No JVD] : no jugular venous distention [No Lymphadenopathy] : no lymphadenopathy [Supple] : supple [Thyroid Normal, No Nodules] : the thyroid was normal and there were no nodules present [No Respiratory Distress] : no respiratory distress  [No Accessory Muscle Use] : no accessory muscle use [Clear to Auscultation] : lungs were clear to auscultation bilaterally [Normal Rate] : normal rate  [Regular Rhythm] : with a regular rhythm [Normal S1, S2] : normal S1 and S2 [No Murmur] : no murmur heard [No Carotid Bruits] : no carotid bruits [No Abdominal Bruit] : a ~M bruit was not heard ~T in the abdomen [No Varicosities] : no varicosities [Pedal Pulses Present] : the pedal pulses are present [No Edema] : there was no peripheral edema [No Palpable Aorta] : no palpable aorta [No Extremity Clubbing/Cyanosis] : no extremity clubbing/cyanosis [Normal Appearance] : normal in appearance [No Nipple Discharge] : no nipple discharge [No Axillary Lymphadenopathy] : no axillary lymphadenopathy [Soft] : abdomen soft [Non Tender] : non-tender [Non-distended] : non-distended [No Masses] : no abdominal mass palpated [No HSM] : no HSM [Normal Bowel Sounds] : normal bowel sounds [No Stool to Guaiac] : no stool to guaiac [Normal Sphincter Tone] : normal sphincter tone [No Mass] : no mass [Testes Mass (___cm)] : there were no testicular masses [No Prostate Nodules] : no prostate nodules [No CVA Tenderness] : no CVA  tenderness [No Spinal Tenderness] : no spinal tenderness [No Joint Swelling] : no joint swelling [Grossly Normal Strength/Tone] : grossly normal strength/tone [No Rash] : no rash [Coordination Grossly Intact] : coordination grossly intact [No Focal Deficits] : no focal deficits [Normal Gait] : normal gait [Normal Affect] : the affect was normal [Normal Insight/Judgement] : insight and judgment were intact [Stool Occult Blood] : stool negative for occult blood [de-identified] : cerumen impaction left

## 2023-04-26 LAB
25(OH)D3 SERPL-MCNC: 48.4 NG/ML
ALBUMIN SERPL ELPH-MCNC: 4.9 G/DL
ALP BLD-CCNC: 57 U/L
ALT SERPL-CCNC: 20 U/L
AMYLASE/CREAT SERPL: 91 U/L
ANION GAP SERPL CALC-SCNC: 14 MMOL/L
APPEARANCE: CLEAR
AST SERPL-CCNC: 24 U/L
BACTERIA: NEGATIVE
BASOPHILS # BLD AUTO: 0.03 K/UL
BASOPHILS NFR BLD AUTO: 0.4 %
BILIRUB SERPL-MCNC: 0.4 MG/DL
BILIRUBIN URINE: NEGATIVE
BLOOD URINE: NEGATIVE
BUN SERPL-MCNC: 13 MG/DL
CALCIUM SERPL-MCNC: 10 MG/DL
CHLORIDE SERPL-SCNC: 103 MMOL/L
CHOLEST SERPL-MCNC: 153 MG/DL
CK SERPL-CCNC: 147 U/L
CO2 SERPL-SCNC: 26 MMOL/L
COLOR: NORMAL
CREAT SERPL-MCNC: 0.8 MG/DL
EGFR: 95 ML/MIN/1.73M2
EOSINOPHIL # BLD AUTO: 0.13 K/UL
EOSINOPHIL NFR BLD AUTO: 1.8 %
ESTIMATED AVERAGE GLUCOSE: 114 MG/DL
GLUCOSE QUALITATIVE U: NEGATIVE
GLUCOSE SERPL-MCNC: 105 MG/DL
HBA1C MFR BLD HPLC: 5.6 %
HCT VFR BLD CALC: 44 %
HDLC SERPL-MCNC: 55 MG/DL
HGB BLD-MCNC: 14.3 G/DL
HYALINE CASTS: 0 /LPF
IMM GRANULOCYTES NFR BLD AUTO: 0.3 %
KETONES URINE: NEGATIVE
LDLC SERPL CALC-MCNC: 83 MG/DL
LEUKOCYTE ESTERASE URINE: NEGATIVE
LPL SERPL-CCNC: 80 U/L
LYMPHOCYTES # BLD AUTO: 2.13 K/UL
LYMPHOCYTES NFR BLD AUTO: 29.8 %
MAN DIFF?: NORMAL
MCHC RBC-ENTMCNC: 31.2 PG
MCHC RBC-ENTMCNC: 32.5 GM/DL
MCV RBC AUTO: 95.9 FL
MICROSCOPIC-UA: NORMAL
MONOCYTES # BLD AUTO: 0.67 K/UL
MONOCYTES NFR BLD AUTO: 9.4 %
NEUTROPHILS # BLD AUTO: 4.17 K/UL
NEUTROPHILS NFR BLD AUTO: 58.3 %
NITRITE URINE: NEGATIVE
NONHDLC SERPL-MCNC: 98 MG/DL
PH URINE: 7
PLATELET # BLD AUTO: 233 K/UL
POTASSIUM SERPL-SCNC: 4.9 MMOL/L
PROT SERPL-MCNC: 7 G/DL
PROTEIN URINE: NEGATIVE
PSA SERPL-MCNC: 1 NG/ML
RBC # BLD: 4.59 M/UL
RBC # FLD: 12.6 %
RED BLOOD CELLS URINE: 1 /HPF
SODIUM SERPL-SCNC: 143 MMOL/L
SPECIFIC GRAVITY URINE: 1.01
SQUAMOUS EPITHELIAL CELLS: 0 /HPF
T4 FREE SERPL-MCNC: 1.3 NG/DL
TRIGL SERPL-MCNC: 75 MG/DL
TSH SERPL-ACNC: 1.37 UIU/ML
URINE CYTOLOGY: NORMAL
UROBILINOGEN URINE: NORMAL
VIT B12 SERPL-MCNC: 484 PG/ML
WBC # FLD AUTO: 7.15 K/UL
WHITE BLOOD CELLS URINE: 0 /HPF

## 2023-05-26 ENCOUNTER — TRANSCRIPTION ENCOUNTER (OUTPATIENT)
Age: 73
End: 2023-05-26

## 2023-05-26 LAB
AMYLASE/CREAT SERPL: 121 U/L
LPL SERPL-CCNC: 243 U/L

## 2023-06-09 ENCOUNTER — TRANSCRIPTION ENCOUNTER (OUTPATIENT)
Age: 73
End: 2023-06-09

## 2023-06-27 ENCOUNTER — OUTPATIENT (OUTPATIENT)
Dept: OUTPATIENT SERVICES | Facility: HOSPITAL | Age: 73
LOS: 1 days | End: 2023-06-27
Payer: MEDICARE

## 2023-06-27 ENCOUNTER — RESULT REVIEW (OUTPATIENT)
Age: 73
End: 2023-06-27

## 2023-06-27 ENCOUNTER — TRANSCRIPTION ENCOUNTER (OUTPATIENT)
Age: 73
End: 2023-06-27

## 2023-06-27 ENCOUNTER — APPOINTMENT (OUTPATIENT)
Dept: GASTROENTEROLOGY | Facility: HOSPITAL | Age: 73
End: 2023-06-27

## 2023-06-27 VITALS
HEART RATE: 49 BPM | HEIGHT: 69 IN | TEMPERATURE: 97 F | DIASTOLIC BLOOD PRESSURE: 65 MMHG | RESPIRATION RATE: 16 BRPM | OXYGEN SATURATION: 100 % | SYSTOLIC BLOOD PRESSURE: 136 MMHG | WEIGHT: 164.91 LBS

## 2023-06-27 VITALS
RESPIRATION RATE: 18 BRPM | DIASTOLIC BLOOD PRESSURE: 71 MMHG | SYSTOLIC BLOOD PRESSURE: 120 MMHG | OXYGEN SATURATION: 96 % | HEART RATE: 65 BPM

## 2023-06-27 DIAGNOSIS — K86.1 OTHER CHRONIC PANCREATITIS: ICD-10-CM

## 2023-06-27 PROCEDURE — 43239 EGD BIOPSY SINGLE/MULTIPLE: CPT

## 2023-06-27 PROCEDURE — 43259 EGD US EXAM DUODENUM/JEJUNUM: CPT

## 2023-06-27 PROCEDURE — 88305 TISSUE EXAM BY PATHOLOGIST: CPT

## 2023-06-27 PROCEDURE — 43237 ENDOSCOPIC US EXAM ESOPH: CPT

## 2023-06-27 PROCEDURE — 88305 TISSUE EXAM BY PATHOLOGIST: CPT | Mod: 26

## 2023-06-27 RX ORDER — TAMSULOSIN HYDROCHLORIDE 0.4 MG/1
1 CAPSULE ORAL
Refills: 0 | DISCHARGE

## 2023-06-27 RX ORDER — PANTOPRAZOLE SODIUM 20 MG/1
1 TABLET, DELAYED RELEASE ORAL
Refills: 0 | DISCHARGE

## 2023-06-27 RX ORDER — ROSUVASTATIN CALCIUM 5 MG/1
1 TABLET ORAL
Refills: 0 | DISCHARGE

## 2023-06-27 RX ORDER — LIDOCAINE HCL 20 MG/ML
4 VIAL (ML) INJECTION ONCE
Refills: 0 | Status: DISCONTINUED | OUTPATIENT
Start: 2023-06-27 | End: 2023-07-11

## 2023-06-27 RX ORDER — PROPRANOLOL HCL 160 MG
1 CAPSULE, EXTENDED RELEASE 24HR ORAL
Refills: 0 | DISCHARGE

## 2023-06-27 NOTE — ASU DISCHARGE PLAN (ADULT/PEDIATRIC) - NS MD DC FALL RISK RISK
For information on Fall & Injury Prevention, visit: [email or web address] OR  [email or web address] OR  https://www.cdc.gov/steadi/patient.html

## 2023-06-27 NOTE — PRE PROCEDURE NOTE - PRE PROCEDURE EVALUATION
Results have been printed and mailed to pt home address on file.    Attending Physician: Michael                           Procedure: ED EUS     Indication for Procedure: dilated PD, history of elevated lipase  ________________________________________________________  PAST MEDICAL & SURGICAL HISTORY:  No pertinent past medical history      No significant past surgical history        ALLERGIES:  No Known Allergies    HOME MEDICATIONS:  pantoprazole 40 mg oral delayed release tablet: 1 orally once a day  propranolol 80 mg oral capsule, extended release: 1 orally once a day  rosuvastatin 5 mg oral tablet: 1 orally once a day  tamsulosin 0.4 mg oral capsule: 1 orally once a day    AICD/PPM: [ ] yes   [ ] no    PERTINENT LAB DATA:                      PHYSICAL EXAMINATION:    Height (cm): 175.3  Weight (kg): 74.8  BMI (kg/m2): 24.3  BSA (m2): 1.9T(C): 36.1  HR: 49  BP: 136/65  RR: 16  SpO2: 100%    Constitutional: NAD  HEENT: PERRLA, EOMI,    Neck:  No JVD  Respiratory: CTAB/L  Cardiovascular: S1 and S2  Gastrointestinal: BS+, soft, NT/ND  Extremities: No peripheral edema  Neurological: A/O x 3, no focal deficits  Psychiatric: Normal mood, normal affect  Skin: No rashes    ASA Class: I [ ]  II [ ]  III [ ]  IV [ ]    COMMENTS:    The patient is a suitable candidate for the planned procedure unless box checked [ ]  No, explain:

## 2023-06-27 NOTE — ASU PATIENT PROFILE, ADULT - FALL HARM RISK - UNIVERSAL INTERVENTIONS
Bed in lowest position, wheels locked, appropriate side rails in place/Call bell, personal items and telephone in reach/Instruct patient to call for assistance before getting out of bed or chair/Non-slip footwear when patient is out of bed/[place] to call system/Physically safe environment - no spills, clutter or unnecessary equipment/Purposeful Proactive Rounding/Room/bathroom lighting operational, light cord in reach

## 2023-06-27 NOTE — PRE-ANESTHESIA EVALUATION ADULT - NSANTHOSAYNRD_GEN_A_CORE
No. LALY screening performed.  STOP BANG Legend: 0-2 = LOW Risk; 3-4 = INTERMEDIATE Risk; 5-8 = HIGH Risk

## 2023-06-29 ENCOUNTER — TRANSCRIPTION ENCOUNTER (OUTPATIENT)
Age: 73
End: 2023-06-29

## 2023-06-29 LAB — SURGICAL PATHOLOGY STUDY: SIGNIFICANT CHANGE UP

## 2023-06-30 ENCOUNTER — TRANSCRIPTION ENCOUNTER (OUTPATIENT)
Age: 73
End: 2023-06-30

## 2023-07-03 ENCOUNTER — TRANSCRIPTION ENCOUNTER (OUTPATIENT)
Age: 73
End: 2023-07-03

## 2023-07-10 ENCOUNTER — TRANSCRIPTION ENCOUNTER (OUTPATIENT)
Age: 73
End: 2023-07-10

## 2023-11-01 ENCOUNTER — TRANSCRIPTION ENCOUNTER (OUTPATIENT)
Age: 73
End: 2023-11-01

## 2023-11-02 ENCOUNTER — TRANSCRIPTION ENCOUNTER (OUTPATIENT)
Age: 73
End: 2023-11-02

## 2023-11-15 ENCOUNTER — TRANSCRIPTION ENCOUNTER (OUTPATIENT)
Age: 73
End: 2023-11-15

## 2023-11-24 ENCOUNTER — TRANSCRIPTION ENCOUNTER (OUTPATIENT)
Age: 73
End: 2023-11-24

## 2023-11-27 PROBLEM — Z78.9 OTHER SPECIFIED HEALTH STATUS: Chronic | Status: ACTIVE | Noted: 2023-06-27

## 2023-11-29 ENCOUNTER — TRANSCRIPTION ENCOUNTER (OUTPATIENT)
Age: 73
End: 2023-11-29

## 2023-11-29 LAB
AMYLASE/CREAT SERPL: 59 U/L
LPL SERPL-CCNC: 28 U/L

## 2023-12-06 ENCOUNTER — APPOINTMENT (OUTPATIENT)
Dept: GASTROENTEROLOGY | Facility: CLINIC | Age: 73
End: 2023-12-06
Payer: MEDICARE

## 2023-12-06 VITALS
SYSTOLIC BLOOD PRESSURE: 128 MMHG | WEIGHT: 167 LBS | DIASTOLIC BLOOD PRESSURE: 76 MMHG | HEIGHT: 69 IN | OXYGEN SATURATION: 100 % | BODY MASS INDEX: 24.73 KG/M2

## 2023-12-06 PROCEDURE — 99214 OFFICE O/P EST MOD 30 MIN: CPT

## 2024-01-10 ENCOUNTER — TRANSCRIPTION ENCOUNTER (OUTPATIENT)
Age: 74
End: 2024-01-10

## 2024-01-10 ENCOUNTER — APPOINTMENT (OUTPATIENT)
Dept: ORTHOPEDIC SURGERY | Facility: CLINIC | Age: 74
End: 2024-01-10
Payer: MEDICARE

## 2024-01-10 VITALS — WEIGHT: 167 LBS | HEIGHT: 69 IN | BODY MASS INDEX: 24.73 KG/M2

## 2024-01-10 DIAGNOSIS — E78.00 PURE HYPERCHOLESTEROLEMIA, UNSPECIFIED: ICD-10-CM

## 2024-01-10 PROCEDURE — 73564 X-RAY EXAM KNEE 4 OR MORE: CPT | Mod: RT

## 2024-01-10 PROCEDURE — 99214 OFFICE O/P EST MOD 30 MIN: CPT

## 2024-01-10 RX ORDER — OFLOXACIN 3 MG/ML
0.3 SOLUTION/ DROPS OPHTHALMIC
Qty: 5 | Refills: 0 | Status: DISCONTINUED | COMMUNITY
Start: 2021-04-29 | End: 2024-01-10

## 2024-01-10 RX ORDER — BROMFENAC 0.76 MG/ML
0.07 SOLUTION/ DROPS OPHTHALMIC
Qty: 5 | Refills: 0 | Status: DISCONTINUED | COMMUNITY
Start: 2021-04-29 | End: 2024-01-10

## 2024-01-11 ENCOUNTER — APPOINTMENT (OUTPATIENT)
Dept: MRI IMAGING | Facility: CLINIC | Age: 74
End: 2024-01-11
Payer: MEDICARE

## 2024-01-11 PROCEDURE — 73721 MRI JNT OF LWR EXTRE W/O DYE: CPT | Mod: RT,MH

## 2024-01-16 NOTE — PHYSICAL EXAM
[NL (0)] : extension 0 degrees [5___] : quadriceps 5[unfilled]/5 [Negative] : negative anterior draw [Right] : right knee [All Views] : anteroposterior, lateral, skyline, and anteroposterior standing [] : non-antalgic [FreeTextEntry9] : X-Ray right knee reveals evidence of mild medial and lateral degenerative changes, otherwise benign.  [TWNoteComboBox7] : flexion 125 degrees

## 2024-01-16 NOTE — DISCUSSION/SUMMARY
[Medication Risks Reviewed] : Medication risks reviewed [Surgical risks reviewed] : Surgical risks reviewed [de-identified] :  The patient is s/p right knee arthroscopic medial and lateral meniscectomies with Dr. Garcia (DOS: 5/21/2020) X-Ray right knee reveals evidence of mild medial and lateral degenerative changes, otherwise benign.   Due to worsening pain and instability with locking mechanical symptoms, recommend the patient obtain MRI right knee to rule out meniscal teat. Follow up after MRI to possibly rule out surgical pathology and discuss future treatment options. In the interim, recommended he avoid any deep squatting.   Meniscal tear vs early arthritis upon physical exam, discussed risks of potential meniscal surgery and risks of operative and non-operative treatment of arthritis. We will obtain an MRI to see if surgery is necessary and guide future treatment. In the interim, we discussed appropriate use of NSAIDs and side effects. Recommended rehab and discussed risks and benefits of possible injection.   Prescribed the patient Motrin 600mgs and discussed risks of side effects and timing and management of medication. Side effects include but are not limited to gi ulcers and irritation, as well as kidney failure and bleeding issues.   Follow up 2 weeks

## 2024-01-16 NOTE — HISTORY OF PRESENT ILLNESS
[de-identified] : Pain again in the right knee today. Had menisectomy back in 2020 and was doing alright but the past year pain has started to come back again. Issues with the knee locking on him and discomfort for a couple days following.

## 2024-01-19 ENCOUNTER — TRANSCRIPTION ENCOUNTER (OUTPATIENT)
Age: 74
End: 2024-01-19

## 2024-01-19 RX ORDER — ROSUVASTATIN CALCIUM 5 MG/1
5 TABLET, FILM COATED ORAL
Qty: 90 | Refills: 3 | Status: ACTIVE | COMMUNITY
Start: 2019-08-28 | End: 1900-01-01

## 2024-01-23 ENCOUNTER — APPOINTMENT (OUTPATIENT)
Dept: ORTHOPEDIC SURGERY | Facility: CLINIC | Age: 74
End: 2024-01-23
Payer: MEDICARE

## 2024-01-23 VITALS — WEIGHT: 167 LBS | HEIGHT: 69 IN | BODY MASS INDEX: 24.73 KG/M2

## 2024-01-23 PROCEDURE — 99213 OFFICE O/P EST LOW 20 MIN: CPT

## 2024-01-23 NOTE — DATA REVIEWED
[MRI] : MRI [Right] : of the right [Knee] : knee [Report was reviewed and noted in the chart] : The report was reviewed and noted in the chart [I independently reviewed and interpreted images and report] : I independently reviewed and interpreted images and report [I reviewed the films/CD] : I reviewed the films/CD [FreeTextEntry1] : MRI right knee reveals evidence of previous medial and lateral meniscectomies without any recurrent tearing, tricompartmental arthritis without loose body.

## 2024-01-23 NOTE — DISCUSSION/SUMMARY
[Medication Risks Reviewed] : Medication risks reviewed [Surgical risks reviewed] : Surgical risks reviewed [de-identified] : MRI right knee reveals evidence of previous medial and lateral meniscectomies without any recurrent tearing, tricompartmental arthritis without loose body.   We reviewed the mri findings and discussed their diagnosis and treatment options at length including the risks and benefits of both surgical and non-surgical options for the patients arthritis. Discussed risks and benefits of total knee arthroplasty vs arthroscopic surgery however, risks outweigh benefits and we will try to avoid. Discussed management of medication.   Discussed risks of surgical treatment and nonsurgical treatment of arthritis, discussed risks of steroid injection plus or minus visco supplementation, risks of zilretta and benefits, role of surgery and MRI, risks and role of NSAIDs and side effects, benefits of therapy.  We discussed possible Visco supplementation injection to treat his arthritic related mechanical symptoms however, the patient declines. If any knee locking/catching persists, the patient will call to obtain a CT scan and rule out loose body.   Prescribed the patient Motrin 600mgs and discussed risks of side effects and timing and management of medication. Side effects include but are not limited to gi ulcers and irritation, as well as kidney failure and bleeding issues.  Follow up 4 weeks

## 2024-01-23 NOTE — PHYSICAL EXAM
[Right] : right knee [NL (0)] : extension 0 degrees [5___] : quadriceps 5[unfilled]/5 [Negative] : negative anterior draw [] : non-antalgic [TWNoteComboBox7] : flexion 125 degrees

## 2024-01-23 NOTE — HISTORY OF PRESENT ILLNESS
[de-identified] : Patient is here for a follow up appointment to review MRI results for the right knee. Patient states he has no pain in the knee. Patient notes locking persists in the knee. Patient is not currently attending physical therapy.

## 2024-01-25 ENCOUNTER — TRANSCRIPTION ENCOUNTER (OUTPATIENT)
Age: 74
End: 2024-01-25

## 2024-02-01 ENCOUNTER — TRANSCRIPTION ENCOUNTER (OUTPATIENT)
Age: 74
End: 2024-02-01

## 2024-02-01 ENCOUNTER — APPOINTMENT (OUTPATIENT)
Dept: CT IMAGING | Facility: CLINIC | Age: 74
End: 2024-02-01
Payer: MEDICARE

## 2024-02-01 PROCEDURE — 73700 CT LOWER EXTREMITY W/O DYE: CPT | Mod: RT,MH

## 2024-02-01 RX ORDER — PANTOPRAZOLE 40 MG/1
40 TABLET, DELAYED RELEASE ORAL DAILY
Qty: 90 | Refills: 3 | Status: ACTIVE | COMMUNITY
Start: 1900-01-01 | End: 1900-01-01

## 2024-02-01 RX ORDER — PROPRANOLOL HYDROCHLORIDE 80 MG/1
80 CAPSULE, EXTENDED RELEASE ORAL
Qty: 90 | Refills: 3 | Status: ACTIVE | COMMUNITY
Start: 1900-01-01 | End: 1900-01-01

## 2024-02-05 ENCOUNTER — APPOINTMENT (OUTPATIENT)
Dept: UROLOGY | Facility: CLINIC | Age: 74
End: 2024-02-05
Payer: MEDICARE

## 2024-02-05 VITALS
OXYGEN SATURATION: 99 % | HEART RATE: 63 BPM | RESPIRATION RATE: 15 BRPM | DIASTOLIC BLOOD PRESSURE: 74 MMHG | TEMPERATURE: 97.6 F | SYSTOLIC BLOOD PRESSURE: 121 MMHG

## 2024-02-05 DIAGNOSIS — R39.15 URGENCY OF URINATION: ICD-10-CM

## 2024-02-05 PROCEDURE — 99214 OFFICE O/P EST MOD 30 MIN: CPT

## 2024-02-05 NOTE — PHYSICAL EXAM
[General Appearance - Well Developed] : well developed [General Appearance - Well Nourished] : well nourished [Heart Rate And Rhythm] : heart rate and rhythm were normal [] : no respiratory distress [Abdomen Soft] : soft [Normal Station and Gait] : the gait and station were normal for the patient's age [Skin Color & Pigmentation] : normal skin color and pigmentation [No Focal Deficits] : no focal deficits [Oriented To Time, Place, And Person] : oriented to person, place, and time [Affect] : the affect was normal [Not Anxious] : not anxious

## 2024-02-05 NOTE — ASSESSMENT
[FreeTextEntry1] : 73M here for urinary urgency and LUTS  #Urinary Urgency  -Pt with  urinary urgency- willing to try Vesicare 5mg and f/u in 6-8 weeks with either phone call or RTO. Pt aware of possible side effects and if it becomes bothersome can d/c medication.   #PSA Screening - Last PSA 1, will draw with PCP - We discussed the fact that PSA is a nonspecific test for cancer although it is prostate specific. We have reviewed the fact that elevations can be caused by multiple separate processes with the prostate including prostate cancer, benign prostate enlargement, prostate inflammation or infection, or combination of any of the above. We also reviewed that procedures involving catheterizations or manipulation of the prostate including colonoscopy could cause PSA to be elevated. I also have reviewed with the patient that there is age specificity related to PSA and that over time there is some expectation that the PSA will slowly rise over time

## 2024-02-05 NOTE — HISTORY OF PRESENT ILLNESS
[Urinary Urgency] : urinary urgency [Nocturia] : nocturia [None] : None [FreeTextEntry1] : HPI: Mr. Esquivel is a 71 yo M here today with a gross hematuria x1 after Saturday. No weight loss. Smokes marijuana every day for the last 50 years. No back pain. Frequent swimmer, in good physical shape. No urinary complaints, occasionally having some urgency. Nocturia x 1. Good erections.  Anticoagulation: No AC All: NKDA Social: MJ smoker, , children PMHx: no HTN, DM or HLD FHx: father was prior smoker PSHx: torn meniscus, trigger finger, epidurals for neck and lower back Labs: PSA 1.04 1/2021 1/18: Here today after negative hematuria workup. Cystoscopy with trilobar hyperplasia. CTU with punctate non-obstructing stones. PVR today 86 cc. Still has frequency from time to time but does have some valsava voiding with pushing. PSA screening with PCP Dr. Yang.  2/16: Here today with Flomax, doing well, PVR today 20 cc. CTU with non-obstructing stones and enlarged prostate. No symptoms of the kidney stones. PSA to be done with PCP. Doing well, emptying well with strong stream. Will avoid the finasteride at this time. PSA with 0.93   2/5/24 PVR = 47cc. Taking Flomax as directed. Pt with nocturia (5-6x)- not bothered by it. Pt notes some urgency at times- willing to try Vesicare 5mg- side effects discussed (dry mouth, constipation etc). PCP will draw PSA by April 2024 (last done in April 2023, was 1 ng/mL). Pt with no symptoms of kidney stones.    [Urinary Incontinence] : no urinary incontinence [Urinary Retention] : no urinary retention [Urinary Frequency] : no urinary frequency [Straining] : no straining [Weak Stream] : no weak stream [Intermittency] : no intermittency [Post-Void Dribbling] : no post-void dribbling [Erectile Dysfunction] : no Erectile Dysfunction [Dysuria] : no dysuria

## 2024-02-14 ENCOUNTER — APPOINTMENT (OUTPATIENT)
Dept: ORTHOPEDIC SURGERY | Facility: CLINIC | Age: 74
End: 2024-02-14
Payer: MEDICARE

## 2024-02-14 DIAGNOSIS — M23.40 LOOSE BODY IN KNEE, UNSPECIFIED KNEE: ICD-10-CM

## 2024-02-14 DIAGNOSIS — M23.41 LOOSE BODY IN KNEE, RIGHT KNEE: ICD-10-CM

## 2024-02-14 PROCEDURE — 99214 OFFICE O/P EST MOD 30 MIN: CPT

## 2024-02-15 ENCOUNTER — APPOINTMENT (OUTPATIENT)
Dept: UROLOGY | Facility: CLINIC | Age: 74
End: 2024-02-15

## 2024-02-17 PROBLEM — M23.41 LOOSE BODY OF RIGHT KNEE: Status: ACTIVE | Noted: 2024-02-17

## 2024-02-17 NOTE — DATA REVIEWED
[MRI] : MRI [Right] : of the right [Knee] : knee [Report was reviewed and noted in the chart] : The report was reviewed and noted in the chart [I independently reviewed and interpreted images and report] : I independently reviewed and interpreted images and report [I reviewed the films/CD] : I reviewed the films/CD [FreeTextEntry1] : MRI of the R knee status post partial medial and lateral  meniscectomy with evidence of small loose body

## 2024-02-17 NOTE — REASON FOR VISIT
[FreeTextEntry2] : right knee  Hypertriglyceridemia Monitoring: I explained this is common when taking isotretinoin. If this worsens they will contact us.

## 2024-02-17 NOTE — DISCUSSION/SUMMARY
[Medication Risks Reviewed] : Medication risks reviewed [Surgical risks reviewed] : Surgical risks reviewed [de-identified] : MRI of the R knee status post partial medial and lateral  meniscectomy with evidence of small loose body  We reviewed the mri findings and discussed treatment options, both operative and non operative. Discussed risks of potential surgery. However, due to the risks of the surgery, we will try NSAIDs and therapy. Discussed management of medication.  Plan for R knee arthroscopic lose body removal    The risks and benefits of surgery have been discussed. Risks include but are not limited to bleeding, infection, reaction to anesthesia, injury to blood vessels and nerves, malunion, nonunion, DVT, PE, necessity of repeat surgery, chronic pain, loss of limb and death. The patient understands the risks and agrees with the surgical plan. All questions have been answered.  f/u post-operatively   I, Shelbie Multani, attest that this documentation has been prepared under the direction and in the presence of Provider Dr. Shamar Garcia

## 2024-02-17 NOTE — HISTORY OF PRESENT ILLNESS
[de-identified] : Patient is here to follow up on CT results for right knee. Notes no pain. Constant locking.

## 2024-02-29 ENCOUNTER — TRANSCRIPTION ENCOUNTER (OUTPATIENT)
Age: 74
End: 2024-02-29

## 2024-03-06 RX ORDER — HYDROCODONE BITARTRATE AND ACETAMINOPHEN 10; 325 MG/1; MG/1
10-325 TABLET ORAL
Qty: 20 | Refills: 0 | Status: ACTIVE | COMMUNITY
Start: 2024-03-06

## 2024-03-07 ENCOUNTER — APPOINTMENT (OUTPATIENT)
Age: 74
End: 2024-03-07
Payer: MEDICARE

## 2024-03-07 PROCEDURE — 29879 ARTHRS KNE SRG ABRASJ ARTHRP: CPT | Mod: AS,59,RT

## 2024-03-07 PROCEDURE — 29881 ARTHRS KNE SRG MNISECTMY M/L: CPT | Mod: AS,RT

## 2024-03-07 PROCEDURE — 29881 ARTHRS KNE SRG MNISECTMY M/L: CPT | Mod: RT

## 2024-03-07 PROCEDURE — 29879 ARTHRS KNE SRG ABRASJ ARTHRP: CPT | Mod: 59,RT

## 2024-03-12 ENCOUNTER — APPOINTMENT (OUTPATIENT)
Dept: ORTHOPEDIC SURGERY | Facility: CLINIC | Age: 74
End: 2024-03-12
Payer: MEDICARE

## 2024-03-12 DIAGNOSIS — Z98.890 OTHER SPECIFIED POSTPROCEDURAL STATES: ICD-10-CM

## 2024-03-12 PROCEDURE — 99024 POSTOP FOLLOW-UP VISIT: CPT

## 2024-03-15 NOTE — DISCUSSION/SUMMARY
[de-identified] : The patient is approximately 5 days s/p right knee partial lateral meniscectomy (DOS: 03/07/2024) - normal course with good progress and no evidence of infection. Incision sites are well approximated, clean, dry, intact, without drainage, without erythema. The patient is instructed in wound management. The patient's post-op plan, protocol and activity modifications have been thoroughly discussed and the patient expressed understanding. Continue physical therapy to work on strengthening.  Follow up in 3 weeks

## 2024-03-15 NOTE — PHYSICAL EXAM
[Right] : right knee [NL (0)] : extension 0 degrees [] : no induration [FreeTextEntry9] : Limited secondary to recent surgery - stable and well balanced throughout range of motion.  [de-identified] : No meniscal signs  [de-identified] : Quadriceps muscle firing  [TWNoteComboBox7] : flexion 125 degrees

## 2024-03-15 NOTE — HISTORY OF PRESENT ILLNESS
[de-identified] : Patient is here for the first post operative appointment for the right knee. 3/7/24 arthroscopic lateral meniscectomy. Patient notes no major issues or concerns after the surgery. Patient notes some soreness and bruising in the knee. Patient notes swelling has mostly subsided.

## 2024-04-12 ENCOUNTER — RX RENEWAL (OUTPATIENT)
Age: 74
End: 2024-04-12

## 2024-04-12 RX ORDER — TAMSULOSIN HYDROCHLORIDE 0.4 MG/1
0.4 CAPSULE ORAL
Qty: 90 | Refills: 3 | Status: ACTIVE | COMMUNITY
Start: 2022-01-18 | End: 1900-01-01

## 2024-04-15 ENCOUNTER — TRANSCRIPTION ENCOUNTER (OUTPATIENT)
Age: 74
End: 2024-04-15

## 2024-04-16 ENCOUNTER — APPOINTMENT (OUTPATIENT)
Dept: ORTHOPEDIC SURGERY | Facility: CLINIC | Age: 74
End: 2024-04-16
Payer: MEDICARE

## 2024-04-16 DIAGNOSIS — M17.11 UNILATERAL PRIMARY OSTEOARTHRITIS, RIGHT KNEE: ICD-10-CM

## 2024-04-16 PROCEDURE — 99024 POSTOP FOLLOW-UP VISIT: CPT

## 2024-04-23 PROBLEM — M17.11 PRIMARY OSTEOARTHRITIS OF RIGHT KNEE: Status: ACTIVE | Noted: 2024-01-10

## 2024-04-23 NOTE — HISTORY OF PRESENT ILLNESS
[de-identified] : Patient is here for a post operative appointment for the right knee. 3/7/24 arthroscopic lateral meniscectomy. Patient states pain has improved since the previous visit. Patient notes he does not currently experience pain. Patient is currently attending physical therapy at [facility] in [place].

## 2024-04-23 NOTE — DISCUSSION/SUMMARY
[de-identified] : The patient is approximately 7 weeks s/p right knee partial lateral meniscectomy (DOS: 03/07/2024) - normal course with good progress and no evidence of infection. Incision sites are well approximated, clean, dry, intact, without drainage, without erythema. The patient is instructed in wound management. The patient's post-op plan, protocol and activity modifications have been thoroughly discussed and the patient expressed understanding.   The patient is doing well and continues to improve on a gradual, interval basis. Plan for PT with transition to HEP Follow up in 4 weeks if pain persist   I, Shelbie Multani, attest that this documentation has been prepared under the direction and in the presence of Provider Dr. Shamar Garcia

## 2024-04-23 NOTE — PHYSICAL EXAM
[Right] : right knee [NL (0)] : extension 0 degrees [de-identified] : Quadriceps muscle firing  [] : no induration [FreeTextEntry9] : - stable and well balanced throughout range of motion.  [de-identified] : No meniscal signs  [TWNoteComboBox7] : flexion 125 degrees

## 2024-04-29 ENCOUNTER — TRANSCRIPTION ENCOUNTER (OUTPATIENT)
Age: 74
End: 2024-04-29

## 2024-04-29 LAB
25(OH)D3 SERPL-MCNC: 42.4 NG/ML
ALBUMIN SERPL ELPH-MCNC: 4.6 G/DL
ALP BLD-CCNC: 56 U/L
ALT SERPL-CCNC: 17 U/L
AMYLASE/CREAT SERPL: 52 U/L
ANION GAP SERPL CALC-SCNC: 11 MMOL/L
APPEARANCE: CLEAR
AST SERPL-CCNC: 21 U/L
BACTERIA: NEGATIVE /HPF
BASOPHILS # BLD AUTO: 0.04 K/UL
BASOPHILS NFR BLD AUTO: 0.6 %
BILIRUB SERPL-MCNC: 0.3 MG/DL
BILIRUBIN URINE: NEGATIVE
BLOOD URINE: NEGATIVE
BUN SERPL-MCNC: 14 MG/DL
CALCIUM SERPL-MCNC: 9.2 MG/DL
CAST: 0 /LPF
CHLORIDE SERPL-SCNC: 104 MMOL/L
CHOLEST SERPL-MCNC: 155 MG/DL
CK SERPL-CCNC: 140 U/L
CO2 SERPL-SCNC: 25 MMOL/L
COLOR: YELLOW
CREAT SERPL-MCNC: 0.87 MG/DL
EGFR: 92 ML/MIN/1.73M2
EOSINOPHIL # BLD AUTO: 0.17 K/UL
EOSINOPHIL NFR BLD AUTO: 2.5 %
EPITHELIAL CELLS: 0 /HPF
ESTIMATED AVERAGE GLUCOSE: 120 MG/DL
GLUCOSE QUALITATIVE U: NEGATIVE MG/DL
GLUCOSE SERPL-MCNC: 116 MG/DL
HBA1C MFR BLD HPLC: 5.8 %
HCT VFR BLD CALC: 41.2 %
HDLC SERPL-MCNC: 55 MG/DL
HGB BLD-MCNC: 14 G/DL
IMM GRANULOCYTES NFR BLD AUTO: 0.3 %
KETONES URINE: NEGATIVE MG/DL
LDLC SERPL CALC-MCNC: 86 MG/DL
LEUKOCYTE ESTERASE URINE: NEGATIVE
LPL SERPL-CCNC: 28 U/L
LYMPHOCYTES # BLD AUTO: 1.91 K/UL
LYMPHOCYTES NFR BLD AUTO: 27.7 %
MAN DIFF?: NORMAL
MCHC RBC-ENTMCNC: 31.7 PG
MCHC RBC-ENTMCNC: 34 GM/DL
MCV RBC AUTO: 93.4 FL
MICROSCOPIC-UA: NORMAL
MONOCYTES # BLD AUTO: 0.77 K/UL
MONOCYTES NFR BLD AUTO: 11.2 %
NEUTROPHILS # BLD AUTO: 3.98 K/UL
NEUTROPHILS NFR BLD AUTO: 57.7 %
NITRITE URINE: NEGATIVE
NONHDLC SERPL-MCNC: 100 MG/DL
PH URINE: 7.5
PLATELET # BLD AUTO: 227 K/UL
POTASSIUM SERPL-SCNC: 5.2 MMOL/L
PROT SERPL-MCNC: 6.8 G/DL
PROTEIN URINE: NEGATIVE MG/DL
PSA SERPL-MCNC: 0.96 NG/ML
RBC # BLD: 4.41 M/UL
RBC # FLD: 12.9 %
RED BLOOD CELLS URINE: 1 /HPF
SODIUM SERPL-SCNC: 140 MMOL/L
SPECIFIC GRAVITY URINE: 1.01
T4 FREE SERPL-MCNC: 1.3 NG/DL
TRIGL SERPL-MCNC: 68 MG/DL
TSH SERPL-ACNC: 1.55 UIU/ML
UROBILINOGEN URINE: 0.2 MG/DL
VIT B12 SERPL-MCNC: 513 PG/ML
WBC # FLD AUTO: 6.89 K/UL
WHITE BLOOD CELLS URINE: 0 /HPF

## 2024-04-30 ENCOUNTER — NON-APPOINTMENT (OUTPATIENT)
Age: 74
End: 2024-04-30

## 2024-04-30 ENCOUNTER — APPOINTMENT (OUTPATIENT)
Dept: INTERNAL MEDICINE | Facility: CLINIC | Age: 74
End: 2024-04-30
Payer: MEDICARE

## 2024-04-30 VITALS — SYSTOLIC BLOOD PRESSURE: 134 MMHG | DIASTOLIC BLOOD PRESSURE: 76 MMHG

## 2024-04-30 VITALS — BODY MASS INDEX: 25.03 KG/M2 | WEIGHT: 169 LBS | HEIGHT: 69 IN

## 2024-04-30 DIAGNOSIS — R73.9 HYPERGLYCEMIA, UNSPECIFIED: ICD-10-CM

## 2024-04-30 DIAGNOSIS — Z00.00 ENCOUNTER FOR GENERAL ADULT MEDICAL EXAMINATION W/OUT ABNORMAL FINDINGS: ICD-10-CM

## 2024-04-30 DIAGNOSIS — N40.0 BENIGN PROSTATIC HYPERPLASIA WITHOUT LOWER URINARY TRACT SYMPMS: ICD-10-CM

## 2024-04-30 DIAGNOSIS — E78.5 HYPERLIPIDEMIA, UNSPECIFIED: ICD-10-CM

## 2024-04-30 DIAGNOSIS — Z86.79 PERSONAL HISTORY OF OTHER DISEASES OF THE CIRCULATORY SYSTEM: ICD-10-CM

## 2024-04-30 DIAGNOSIS — K86.1 OTHER CHRONIC PANCREATITIS: ICD-10-CM

## 2024-04-30 PROCEDURE — G0439: CPT

## 2024-04-30 PROCEDURE — 93000 ELECTROCARDIOGRAM COMPLETE: CPT

## 2024-04-30 NOTE — HEALTH RISK ASSESSMENT
[No] : No [Never (0 pts)] : Never (0 points) [No falls in past year] : Patient reported no falls in the past year [0] : 2) Feeling down, depressed, or hopeless: Not at all (0) [PHQ-2 Negative - No further assessment needed] : PHQ-2 Negative - No further assessment needed [Patient reported colonoscopy was normal] : Patient reported colonoscopy was normal [Never] : Never

## 2024-04-30 NOTE — PHYSICAL EXAM
[Well Nourished] : well nourished [Well Developed] : well developed [Well-Appearing] : well-appearing [Normal Sclera/Conjunctiva] : normal sclera/conjunctiva [PERRL] : pupils equal round and reactive to light [EOMI] : extraocular movements intact [Normal Outer Ear/Nose] : the outer ears and nose were normal in appearance [Normal Oropharynx] : the oropharynx was normal [No JVD] : no jugular venous distention [No Lymphadenopathy] : no lymphadenopathy [Supple] : supple [Thyroid Normal, No Nodules] : the thyroid was normal and there were no nodules present [No Respiratory Distress] : no respiratory distress  [No Accessory Muscle Use] : no accessory muscle use [Clear to Auscultation] : lungs were clear to auscultation bilaterally [Normal Rate] : normal rate  [Regular Rhythm] : with a regular rhythm [Normal S1, S2] : normal S1 and S2 [No Murmur] : no murmur heard [No Carotid Bruits] : no carotid bruits [No Abdominal Bruit] : a ~M bruit was not heard ~T in the abdomen [No Varicosities] : no varicosities [Pedal Pulses Present] : the pedal pulses are present [No Edema] : there was no peripheral edema [No Palpable Aorta] : no palpable aorta [No Extremity Clubbing/Cyanosis] : no extremity clubbing/cyanosis [Normal Appearance] : normal in appearance [No Nipple Discharge] : no nipple discharge [No Axillary Lymphadenopathy] : no axillary lymphadenopathy [Soft] : abdomen soft [Non Tender] : non-tender [Non-distended] : non-distended [No Masses] : no abdominal mass palpated [No HSM] : no HSM [Normal Bowel Sounds] : normal bowel sounds [No Stool to Guaiac] : no stool to guaiac [Normal Sphincter Tone] : normal sphincter tone [No Mass] : no mass [Testes Mass (___cm)] : there were no testicular masses [No Prostate Nodules] : no prostate nodules [No CVA Tenderness] : no CVA  tenderness [No Spinal Tenderness] : no spinal tenderness [No Joint Swelling] : no joint swelling [Grossly Normal Strength/Tone] : grossly normal strength/tone [No Rash] : no rash [Coordination Grossly Intact] : coordination grossly intact [No Focal Deficits] : no focal deficits [Normal Gait] : normal gait [Normal Affect] : the affect was normal [Normal Insight/Judgement] : insight and judgment were intact [Normal Posterior Cervical Nodes] : no posterior cervical lymphadenopathy [Normal Anterior Cervical Nodes] : no anterior cervical lymphadenopathy

## 2024-05-02 ENCOUNTER — TRANSCRIPTION ENCOUNTER (OUTPATIENT)
Age: 74
End: 2024-05-02

## 2024-05-02 RX ORDER — SOLIFENACIN SUCCINATE 5 MG/1
5 TABLET ORAL
Qty: 90 | Refills: 3 | Status: ACTIVE | COMMUNITY
Start: 2024-02-05 | End: 1900-01-01

## 2024-05-15 ENCOUNTER — TRANSCRIPTION ENCOUNTER (OUTPATIENT)
Age: 74
End: 2024-05-15

## 2024-05-15 RX ORDER — TELMISARTAN 20 MG/1
20 TABLET ORAL
Qty: 90 | Refills: 3 | Status: ACTIVE | COMMUNITY
Start: 2024-05-02 | End: 1900-01-01

## 2024-08-26 ENCOUNTER — APPOINTMENT (OUTPATIENT)
Dept: ORTHOPEDIC SURGERY | Facility: CLINIC | Age: 74
End: 2024-08-26
Payer: MEDICARE

## 2024-08-26 VITALS — WEIGHT: 169 LBS | BODY MASS INDEX: 25.03 KG/M2 | HEIGHT: 69 IN

## 2024-08-26 DIAGNOSIS — M17.11 UNILATERAL PRIMARY OSTEOARTHRITIS, RIGHT KNEE: ICD-10-CM

## 2024-08-26 DIAGNOSIS — M70.20 OLECRANON BURSITIS, UNSPECIFIED ELBOW: ICD-10-CM

## 2024-08-26 DIAGNOSIS — M25.729 OSTEOPHYTE, UNSPECIFIED ELBOW: ICD-10-CM

## 2024-08-26 PROCEDURE — 73080 X-RAY EXAM OF ELBOW: CPT | Mod: LT

## 2024-08-26 PROCEDURE — J3490M: CUSTOM | Mod: NC,JZ

## 2024-08-26 PROCEDURE — 99214 OFFICE O/P EST MOD 30 MIN: CPT | Mod: 25

## 2024-08-26 PROCEDURE — 20606 DRAIN/INJ JOINT/BURSA W/US: CPT | Mod: LT

## 2024-08-26 RX ORDER — MELOXICAM 15 MG/1
15 TABLET ORAL
Qty: 30 | Refills: 0 | Status: ACTIVE | COMMUNITY
Start: 2024-08-26 | End: 1900-01-01

## 2024-08-31 NOTE — HISTORY OF PRESENT ILLNESS
[de-identified] : Patient is here for left elbow swelling that began 4 weeks ago, has worsened since. Denies n/t. Not warm to touch. Not experiencing pain. No prior injury. No formal treatment. Did not try medication. Was under the care of Jose in 4/2024 for right knee OA/loose body. Notes doing well since last visit.

## 2024-08-31 NOTE — DISCUSSION/SUMMARY
[Medication Risks Reviewed] : Medication risks reviewed [Surgical risks reviewed] : Surgical risks reviewed [de-identified] : The patient's condition is acute Confounding medical conditions/concerns: hx of high cholesterol, hypertension, hx of beta blocker med use,  Tests/Studies Independently Interpreted Today:  xray of the L elbow revealed evidence of olecranon effusion  ------------------------------------------------------------------------------------------------------------------  Discussed treatment options for pt's Olecranon bursitis. Including aspiration and inj vs NSAIDs Pt would like to proceed with L elbow olecranon bursa aspiration and CSI inj today    Discussed treatment options in the form of injection therapy. Patient elected to receive L olecranon bursa CSI inj under ultrasound guidance. Advised patient to rest and ice the area.  Plan for L olecranon bursa aspiration- 10ccs of blood  The risks, benefits and contents of the injection have been discussed. Risks include but are not limited to allergic reaction, flare reaction, permanent white skin discoloration at the injection site and infection.  The patient understands the risks and agrees to having the injection.  All questions have been answered.   Discussed the timing of the injections and the follow up that is needed. Advised the patient to ice the area that was injected and that it may take a few days before experiencing relief, discussed signs and symptoms of infection which could indicated a life threatening septic bursitis Prescribed patient Meloxicam 15mg daily and discussed risks of side effects, as well as timing/management of medication.  Side effects can include but are not limited to gastrointestinal ulcers and irritation, kidney failure, and bleeding issues. Use as directed and take with food to manage pain, inflammation, and discomfort. in regards to his knees occasional pain but stable   f/u in 4 weeks if effusion persist    I, Shelbie Multani, attest that this documentation has been prepared under the direction and in the presence of Provider Dr. Shamar Garcia

## 2024-08-31 NOTE — HISTORY OF PRESENT ILLNESS
[de-identified] : Patient is here for left elbow swelling that began 4 weeks ago, has worsened since. Denies n/t. Not warm to touch. Not experiencing pain. No prior injury. No formal treatment. Did not try medication. Was under the care of Jose in 4/2024 for right knee OA/loose body. Notes doing well since last visit.

## 2024-08-31 NOTE — HISTORY OF PRESENT ILLNESS
[de-identified] : Patient is here for left elbow swelling that began 4 weeks ago, has worsened since. Denies n/t. Not warm to touch. Not experiencing pain. No prior injury. No formal treatment. Did not try medication. Was under the care of Jose in 4/2024 for right knee OA/loose body. Notes doing well since last visit.

## 2024-08-31 NOTE — DISCUSSION/SUMMARY
[Medication Risks Reviewed] : Medication risks reviewed [Surgical risks reviewed] : Surgical risks reviewed [de-identified] : The patient's condition is acute Confounding medical conditions/concerns: hx of high cholesterol, hypertension, hx of beta blocker med use,  Tests/Studies Independently Interpreted Today:  xray of the L elbow revealed evidence of olecranon effusion  ------------------------------------------------------------------------------------------------------------------  Discussed treatment options for pt's Olecranon bursitis. Including aspiration and inj vs NSAIDs Pt would like to proceed with L elbow olecranon bursa aspiration and CSI inj today    Discussed treatment options in the form of injection therapy. Patient elected to receive L olecranon bursa CSI inj under ultrasound guidance. Advised patient to rest and ice the area.  Plan for L olecranon bursa aspiration- 10ccs of blood  The risks, benefits and contents of the injection have been discussed. Risks include but are not limited to allergic reaction, flare reaction, permanent white skin discoloration at the injection site and infection.  The patient understands the risks and agrees to having the injection.  All questions have been answered.   Discussed the timing of the injections and the follow up that is needed. Advised the patient to ice the area that was injected and that it may take a few days before experiencing relief, discussed signs and symptoms of infection which could indicated a life threatening septic bursitis Prescribed patient Meloxicam 15mg daily and discussed risks of side effects, as well as timing/management of medication.  Side effects can include but are not limited to gastrointestinal ulcers and irritation, kidney failure, and bleeding issues. Use as directed and take with food to manage pain, inflammation, and discomfort. in regards to his knees occasional pain but stable   f/u in 4 weeks if effusion persist    I, Shelbie Multani, attest that this documentation has been prepared under the direction and in the presence of Provider Dr. Shamar Garcia

## 2024-08-31 NOTE — PROCEDURE
[FreeTextEntry3] :  Procedure Note: Musculoskeletal Injection Procedure: Left olecranon bursa (1/2/1/2) Celestone injection   Indication: The patient has had persistent pain despite conservative treatment. Risks, benefits and alternatives to procedure were discussed; all questions were answered to the patient's apparent satisfaction and informed consent obtained. The patient denied prior problems with local anesthetics, injectable cortisones, chicken allergy, coagulopathy and no relevant drug or preservative allergies or sensitivities.   The area of injection was prepared in a sterile fashion. Prior to injection a 'Time Out' was conducted in accordance with [facility] policy and the site and nature of procedure verified with the patient.   Procedure: The procedure was carried out utilizing sterile technique from a **olecranon bursal l** arthroscopic portal position. The needle was placed under ultrasound guidance to improve accuracy and minimize risk to the patient and diagnostic ultrasound in the long and short axis revealed evidence of olecranon effusion   Ultrasound Indication: effusion/ precise aspiration and inj   0cc of clear synovial fluid was aspirated   Injection into the target area with care taken to aspirate frequently to minimize the risk of intravascular injection was performed with: ( ) 1cc of Depomedrol (80mg/ml) (X) 2cc of Betamethasone (Celestone) (6mg/ml) ( ) 1cc of Toradol (30mg/ml) (X) 9cc of 0.5% Bupivacaine ( ) 1cc of 1% Lidocaine ( ) 5cc of 32mg Zilretta, prepared and diluted per  instructions   Patient tolerated the procedure well and direct pressure was applied for hemostasis. The patient was reminded of potential post-injection risks including, but not limited to, delayed hypersensitivity reactions and/or infection. The patient verified that they had the office and the Emergency Room's contact information if any problems should arise. After several minutes, the patient informed me that they felt fine and was released from the office.

## 2024-08-31 NOTE — PROCEDURE
[FreeTextEntry3] :  Procedure Note: Musculoskeletal Injection Procedure: Left olecranon bursa (1/2/1/2) Celestone injection   Indication: The patient has had persistent pain despite conservative treatment. Risks, benefits and alternatives to procedure were discussed; all questions were answered to the patient's apparent satisfaction and informed consent obtained. The patient denied prior problems with local anesthetics, injectable cortisones, chicken allergy, coagulopathy and no relevant drug or preservative allergies or sensitivities.   The area of injection was prepared in a sterile fashion. Prior to injection a 'Time Out' was conducted in accordance with [employer] [facility] policy and the site and nature of procedure verified with the patient.   Procedure: The procedure was carried out utilizing sterile technique from a **olecranon bursal l** arthroscopic portal position. The needle was placed under ultrasound guidance to improve accuracy and minimize risk to the patient and diagnostic ultrasound in the long and short axis revealed evidence of olecranon effusion   Ultrasound Indication: effusion/ precise aspiration and inj   0cc of clear synovial fluid was aspirated   Injection into the target area with care taken to aspirate frequently to minimize the risk of intravascular injection was performed with: ( ) 1cc of Depomedrol (80mg/ml) (X) 2cc of Betamethasone (Celestone) (6mg/ml) ( ) 1cc of Toradol (30mg/ml) (X) 9cc of 0.5% Bupivacaine ( ) 1cc of 1% Lidocaine ( ) 5cc of 32mg Zilretta, prepared and diluted per  instructions   Patient tolerated the procedure well and direct pressure was applied for hemostasis. The patient was reminded of potential post-injection risks including, but not limited to, delayed hypersensitivity reactions and/or infection. The patient verified that they had the office and the Emergency Room's contact information if any problems should arise. After several minutes, the patient informed me that they felt fine and was released from the office.

## 2024-08-31 NOTE — PROCEDURE
[FreeTextEntry3] :  Procedure Note: Musculoskeletal Injection Procedure: Left olecranon bursa (1/2/1/2) Celestone injection   Indication: The patient has had persistent pain despite conservative treatment. Risks, benefits and alternatives to procedure were discussed; all questions were answered to the patient's apparent satisfaction and informed consent obtained. The patient denied prior problems with local anesthetics, injectable cortisones, chicken allergy, coagulopathy and no relevant drug or preservative allergies or sensitivities.   The area of injection was prepared in a sterile fashion. Prior to injection a 'Time Out' was conducted in accordance with [facility] [employer] [facility] policy and the site and nature of procedure verified with the patient.   Procedure: The procedure was carried out utilizing sterile technique from a **olecranon bursal l** arthroscopic portal position. The needle was placed under ultrasound guidance to improve accuracy and minimize risk to the patient and diagnostic ultrasound in the long and short axis revealed evidence of olecranon effusion   Ultrasound Indication: effusion/ precise aspiration and inj   0cc of clear synovial fluid was aspirated   Injection into the target area with care taken to aspirate frequently to minimize the risk of intravascular injection was performed with: ( ) 1cc of Depomedrol (80mg/ml) (X) 2cc of Betamethasone (Celestone) (6mg/ml) ( ) 1cc of Toradol (30mg/ml) (X) 9cc of 0.5% Bupivacaine ( ) 1cc of 1% Lidocaine ( ) 5cc of 32mg Zilretta, prepared and diluted per  instructions   Patient tolerated the procedure well and direct pressure was applied for hemostasis. The patient was reminded of potential post-injection risks including, but not limited to, delayed hypersensitivity reactions and/or infection. The patient verified that they had the office and the Emergency Room's contact information if any problems should arise. After several minutes, the patient informed me that they felt fine and was released from the office.

## 2024-08-31 NOTE — DISCUSSION/SUMMARY
[Medication Risks Reviewed] : Medication risks reviewed [Surgical risks reviewed] : Surgical risks reviewed [de-identified] : The patient's condition is acute Confounding medical conditions/concerns: hx of high cholesterol, hypertension, hx of beta blocker med use,  Tests/Studies Independently Interpreted Today:  xray of the L elbow revealed evidence of olecranon effusion  ------------------------------------------------------------------------------------------------------------------  Discussed treatment options for pt's Olecranon bursitis. Including aspiration and inj vs NSAIDs Pt would like to proceed with L elbow olecranon bursa aspiration and CSI inj today    Discussed treatment options in the form of injection therapy. Patient elected to receive L olecranon bursa CSI inj under ultrasound guidance. Advised patient to rest and ice the area.  Plan for L olecranon bursa aspiration- 10ccs of blood  The risks, benefits and contents of the injection have been discussed. Risks include but are not limited to allergic reaction, flare reaction, permanent white skin discoloration at the injection site and infection.  The patient understands the risks and agrees to having the injection.  All questions have been answered.   Discussed the timing of the injections and the follow up that is needed. Advised the patient to ice the area that was injected and that it may take a few days before experiencing relief, discussed signs and symptoms of infection which could indicated a life threatening septic bursitis Prescribed patient Meloxicam 15mg daily and discussed risks of side effects, as well as timing/management of medication.  Side effects can include but are not limited to gastrointestinal ulcers and irritation, kidney failure, and bleeding issues. Use as directed and take with food to manage pain, inflammation, and discomfort. in regards to his knees occasional pain but stable   f/u in 4 weeks if effusion persist    I, Shelbie Multani, attest that this documentation has been prepared under the direction and in the presence of Provider Dr. Shamar Garcia

## 2024-09-10 ENCOUNTER — APPOINTMENT (OUTPATIENT)
Dept: INTERNAL MEDICINE | Facility: CLINIC | Age: 74
End: 2024-09-10
Payer: MEDICARE

## 2024-09-10 VITALS — BODY MASS INDEX: 25.18 KG/M2 | WEIGHT: 170 LBS | HEIGHT: 69 IN

## 2024-09-10 VITALS — DIASTOLIC BLOOD PRESSURE: 74 MMHG | SYSTOLIC BLOOD PRESSURE: 122 MMHG

## 2024-09-10 VITALS — SYSTOLIC BLOOD PRESSURE: 124 MMHG | DIASTOLIC BLOOD PRESSURE: 76 MMHG

## 2024-09-10 DIAGNOSIS — K86.1 OTHER CHRONIC PANCREATITIS: ICD-10-CM

## 2024-09-10 DIAGNOSIS — E78.5 HYPERLIPIDEMIA, UNSPECIFIED: ICD-10-CM

## 2024-09-10 DIAGNOSIS — I10 ESSENTIAL (PRIMARY) HYPERTENSION: ICD-10-CM

## 2024-09-10 PROCEDURE — G2211 COMPLEX E/M VISIT ADD ON: CPT

## 2024-09-10 PROCEDURE — 99213 OFFICE O/P EST LOW 20 MIN: CPT

## 2024-09-10 NOTE — ASSESSMENT
[FreeTextEntry1] : athletic doing very well tolerating telmisartan no ill effects BP perfect prediabetes

## 2024-09-10 NOTE — HISTORY OF PRESENT ILLNESS
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

## 2024-09-10 NOTE — PHYSICAL EXAM
[Well Nourished] : well nourished [Well Developed] : well developed [Normal Sclera/Conjunctiva] : normal sclera/conjunctiva [Normal Outer Ear/Nose] : the outer ears and nose were normal in appearance [Normal Oropharynx] : the oropharynx was normal [No JVD] : no jugular venous distention [No Lymphadenopathy] : no lymphadenopathy [Supple] : supple [No Respiratory Distress] : no respiratory distress  [No Accessory Muscle Use] : no accessory muscle use [Clear to Auscultation] : lungs were clear to auscultation bilaterally [Normal Rate] : normal rate  [Regular Rhythm] : with a regular rhythm [Normal S1, S2] : normal S1 and S2 [No Carotid Bruits] : no carotid bruits [No Abdominal Bruit] : a ~M bruit was not heard ~T in the abdomen [No Varicosities] : no varicosities [No Palpable Aorta] : no palpable aorta [No Extremity Clubbing/Cyanosis] : no extremity clubbing/cyanosis [Soft] : abdomen soft [Non Tender] : non-tender [Non-distended] : non-distended [No Masses] : no abdominal mass palpated [No HSM] : no HSM [Normal Bowel Sounds] : normal bowel sounds [Normal Posterior Cervical Nodes] : no posterior cervical lymphadenopathy [Normal Anterior Cervical Nodes] : no anterior cervical lymphadenopathy [No CVA Tenderness] : no CVA  tenderness [No Spinal Tenderness] : no spinal tenderness [No Joint Swelling] : no joint swelling [Grossly Normal Strength/Tone] : grossly normal strength/tone [No Rash] : no rash [Coordination Grossly Intact] : coordination grossly intact [No Focal Deficits] : no focal deficits [Normal Affect] : the affect was normal [Normal Insight/Judgement] : insight and judgment were intact

## 2024-09-10 NOTE — HISTORY OF PRESENT ILLNESS
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

## 2024-09-23 ENCOUNTER — APPOINTMENT (OUTPATIENT)
Dept: ORTHOPEDIC SURGERY | Facility: CLINIC | Age: 74
End: 2024-09-23
Payer: MEDICARE

## 2024-09-23 VITALS — BODY MASS INDEX: 25.18 KG/M2 | HEIGHT: 69 IN | WEIGHT: 170 LBS

## 2024-09-23 DIAGNOSIS — M70.20 OLECRANON BURSITIS, UNSPECIFIED ELBOW: ICD-10-CM

## 2024-09-23 DIAGNOSIS — M25.729 OSTEOPHYTE, UNSPECIFIED ELBOW: ICD-10-CM

## 2024-09-23 PROCEDURE — 20605 DRAIN/INJ JOINT/BURSA W/O US: CPT | Mod: LT

## 2024-09-23 PROCEDURE — 99214 OFFICE O/P EST MOD 30 MIN: CPT | Mod: 25

## 2024-10-03 NOTE — DISCUSSION/SUMMARY
[Medication Risks Reviewed] : Medication risks reviewed [Surgical risks reviewed] : Surgical risks reviewed [de-identified] : The patient's condition is acute Confounding medical conditions/concerns: hx of high cholesterol, hypertension, hx of beta blocker med use,  Tests/Studies Independently Interpreted Today:  ------------------------------------------------------------------------------------------------------------------  Discussed treatment options for pt's recurrent Olecranon bursitis considering he had 3 weeks relief from prev asp and inj- rec repeating for relief  Pt would like to proceed with L elbow olecranon bursa aspiration and CSI inj today    Discussed treatment options in the form of injection therapy. Patient elected to receive L olecranon bursa CSI inj . Advised patient to rest and ice the area.   Plan for L olecranon bursa aspiration- 15ccs   The risks, benefits and contents of the injection have been discussed. Risks include but are not limited to allergic reaction, flare reaction, permanent white skin discoloration at the injection site and infection.  The patient understands the risks and agrees to having the injection.  All questions have been answered.   Discussed the timing of the injections and the follow up that is needed. Advised the patient to ice the area that was injected and that it may take a few days before experiencing relief.  Rec he avoid leaning on the elbow which could cause the bursa to become reaggravated    Continue PRN use of Mobic. Discussed risks of side effects and timing and management of medication.  Side effects can include but are not limited to gi ulcers and irritation, as well as kidney failure and bleeding issues. Use as directed and take with food.    f/u in 4 weeks if pain persist       I, Shelbie Multani, attest that this documentation has been prepared under the direction and in the presence of Provider Dr. Shamar Garcia

## 2024-10-03 NOTE — DISCUSSION/SUMMARY
[Medication Risks Reviewed] : Medication risks reviewed [Surgical risks reviewed] : Surgical risks reviewed [de-identified] : The patient's condition is acute Confounding medical conditions/concerns: hx of high cholesterol, hypertension, hx of beta blocker med use,  Tests/Studies Independently Interpreted Today:  ------------------------------------------------------------------------------------------------------------------  Discussed treatment options for pt's recurrent Olecranon bursitis considering he had 3 weeks relief from prev asp and inj- rec repeating for relief  Pt would like to proceed with L elbow olecranon bursa aspiration and CSI inj today    Discussed treatment options in the form of injection therapy. Patient elected to receive L olecranon bursa CSI inj . Advised patient to rest and ice the area.   Plan for L olecranon bursa aspiration- 15ccs   The risks, benefits and contents of the injection have been discussed. Risks include but are not limited to allergic reaction, flare reaction, permanent white skin discoloration at the injection site and infection.  The patient understands the risks and agrees to having the injection.  All questions have been answered.   Discussed the timing of the injections and the follow up that is needed. Advised the patient to ice the area that was injected and that it may take a few days before experiencing relief.  Rec he avoid leaning on the elbow which could cause the bursa to become reaggravated    Continue PRN use of Mobic. Discussed risks of side effects and timing and management of medication.  Side effects can include but are not limited to gi ulcers and irritation, as well as kidney failure and bleeding issues. Use as directed and take with food.    f/u in 4 weeks if pain persist       I, Shelbie Multani, attest that this documentation has been prepared under the direction and in the presence of Provider Dr. Shaamr Garcia

## 2024-10-03 NOTE — PROCEDURE
[FreeTextEntry3] :  Procedure Note: Musculoskeletal Injection Procedure: Left olecranon bursa (1/2/1/2) Celestone injection   Indication: The patient has had persistent pain despite conservative treatment. Risks, benefits and alternatives to procedure were discussed; all questions were answered to the patient's apparent satisfaction and informed consent obtained. The patient denied prior problems with local anesthetics, injectable cortisones, chicken allergy, coagulopathy and no relevant drug or preservative allergies or sensitivities.   The area of injection was prepared in a sterile fashion. Prior to injection a 'Time Out' was conducted in accordance with [facility] policy and the site and nature of procedure verified with the patient.   Procedure: The procedure was carried out utilizing sterile technique from a **olecranon bursal l** arthroscopic portal position.   15cc of  fluid was aspirated   Injection into the target area with care taken to aspirate frequently to minimize the risk of intravascular injection was performed with: ( ) 1cc of Depomedrol (80mg/ml) (X)1/ 2cc of Betamethasone (Celestone) (6mg/ml) ( ) 1cc of Toradol (30mg/ml) (X) 1/2 cc of 0.5% Bupivacaine ( ) 1cc of 1% Lidocaine ( ) 5cc of 32mg Zilretta, prepared and diluted per  instructions   Patient tolerated the procedure well and direct pressure was applied for hemostasis. The patient was reminded of potential post-injection risks including, but not limited to, delayed hypersensitivity reactions and/or infection. The patient verified that they had the office and the Emergency Room's contact information if any problems should arise. After several minutes, the patient informed me that they felt fine and was released from the office.

## 2024-10-03 NOTE — PROCEDURE
[FreeTextEntry3] :  Procedure Note: Musculoskeletal Injection Procedure: Left olecranon bursa (1/2/1/2) Celestone injection   Indication: The patient has had persistent pain despite conservative treatment. Risks, benefits and alternatives to procedure were discussed; all questions were answered to the patient's apparent satisfaction and informed consent obtained. The patient denied prior problems with local anesthetics, injectable cortisones, chicken allergy, coagulopathy and no relevant drug or preservative allergies or sensitivities.   The area of injection was prepared in a sterile fashion. Prior to injection a 'Time Out' was conducted in accordance with [facility] policy and the site and nature of procedure verified with the patient.   Procedure: The procedure was carried out utilizing sterile technique from a **olecranon bursal l** arthroscopic portal position.   15cc of  fluid was aspirated   Injection into the target area with care taken to aspirate frequently to minimize the risk of intravascular injection was performed with: ( ) 1cc of Depomedrol (80mg/ml) (X)1/ 2cc of Betamethasone (Celestone) (6mg/ml) ( ) 1cc of Toradol (30mg/ml) (X) 1/2 cc of 0.5% Bupivacaine ( ) 1cc of 1% Lidocaine ( ) 5cc of 32mg Zilretta, prepared and diluted per  instructions   Patient tolerated the procedure well and direct pressure was applied for hemostasis. The patient was reminded of potential post-injection risks including, but not limited to, delayed hypersensitivity reactions and/or infection. The patient verified that they had the office and the Emergency Room's contact information if any problems should arise. After several minutes, the patient informed me that they felt fine and was released from the office.

## 2024-10-03 NOTE — PROCEDURE
[FreeTextEntry3] :  Procedure Note: Musculoskeletal Injection Procedure: Left olecranon bursa (1/2/1/2) Celestone injection   Indication: The patient has had persistent pain despite conservative treatment. Risks, benefits and alternatives to procedure were discussed; all questions were answered to the patient's apparent satisfaction and informed consent obtained. The patient denied prior problems with local anesthetics, injectable cortisones, chicken allergy, coagulopathy and no relevant drug or preservative allergies or sensitivities.   The area of injection was prepared in a sterile fashion. Prior to injection a 'Time Out' was conducted in accordance with [facility] policy and the site and nature of procedure verified with the patient.   Procedure: The procedure was carried out utilizing sterile technique from a **olecranon bursal l** arthroscopic portal position.   15cc of  fluid was aspirated   Injection into the target area with care taken to aspirate frequently to minimize the risk of intravascular injection was performed with: ( ) 1cc of Depomedrol (80mg/ml) (X)1/ 2cc of Betamethasone (Celestone) (6mg/ml) ( ) 1cc of Toradol (30mg/ml) (X) 1/2 cc of 0.5% Bupivacaine ( ) 1cc of 1% Lidocaine ( ) 5cc of 32mg Zilretta, prepared and diluted per  instructions   Patient tolerated the procedure well and direct pressure was applied for hemostasis. The patient was reminded of potential post-injection risks including, but not limited to, delayed hypersensitivity reactions and/or infection. The patient verified that they had the office and the Emergency Room's contact information if any problems should arise. After several minutes, the patient informed me that they felt fine and was released from the office.

## 2024-10-03 NOTE — HISTORY OF PRESENT ILLNESS
[de-identified] : Patient is here to follow up on left elbow olecranon bursitis. Notes 3 weeks improvement with aspiration/celestone injection from 8/26/24. Increased lateral swelling recently. No pain.

## 2024-10-03 NOTE — DISCUSSION/SUMMARY
[Medication Risks Reviewed] : Medication risks reviewed [Surgical risks reviewed] : Surgical risks reviewed [de-identified] : The patient's condition is acute Confounding medical conditions/concerns: hx of high cholesterol, hypertension, hx of beta blocker med use,  Tests/Studies Independently Interpreted Today:  ------------------------------------------------------------------------------------------------------------------  Discussed treatment options for pt's recurrent Olecranon bursitis considering he had 3 weeks relief from prev asp and inj- rec repeating for relief  Pt would like to proceed with L elbow olecranon bursa aspiration and CSI inj today    Discussed treatment options in the form of injection therapy. Patient elected to receive L olecranon bursa CSI inj . Advised patient to rest and ice the area.   Plan for L olecranon bursa aspiration- 15ccs   The risks, benefits and contents of the injection have been discussed. Risks include but are not limited to allergic reaction, flare reaction, permanent white skin discoloration at the injection site and infection.  The patient understands the risks and agrees to having the injection.  All questions have been answered.   Discussed the timing of the injections and the follow up that is needed. Advised the patient to ice the area that was injected and that it may take a few days before experiencing relief.  Rec he avoid leaning on the elbow which could cause the bursa to become reaggravated    Continue PRN use of Mobic. Discussed risks of side effects and timing and management of medication.  Side effects can include but are not limited to gi ulcers and irritation, as well as kidney failure and bleeding issues. Use as directed and take with food.    f/u in 4 weeks if pain persist       I, Shelbie Multani, attest that this documentation has been prepared under the direction and in the presence of Provider Dr. Shamar Garcia

## 2024-10-03 NOTE — HISTORY OF PRESENT ILLNESS
[de-identified] : Patient is here to follow up on left elbow olecranon bursitis. Notes 3 weeks improvement with aspiration/celestone injection from 8/26/24. Increased lateral swelling recently. No pain.

## 2024-10-03 NOTE — HISTORY OF PRESENT ILLNESS
[de-identified] : Patient is here to follow up on left elbow olecranon bursitis. Notes 3 weeks improvement with aspiration/celestone injection from 8/26/24. Increased lateral swelling recently. No pain.

## 2024-10-04 DIAGNOSIS — Z12.11 ENCOUNTER FOR SCREENING FOR MALIGNANT NEOPLASM OF COLON: ICD-10-CM

## 2024-10-09 RX ORDER — SODIUM SULFATE, POTASSIUM SULFATE AND MAGNESIUM SULFATE 1.6; 3.13; 17.5 G/177ML; G/177ML; G/177ML
17.5-3.13-1.6 SOLUTION ORAL
Qty: 1 | Refills: 0 | Status: ACTIVE | COMMUNITY
Start: 2024-10-04 | End: 1900-01-01

## 2024-10-15 ENCOUNTER — RESULT REVIEW (OUTPATIENT)
Age: 74
End: 2024-10-15

## 2024-10-15 ENCOUNTER — APPOINTMENT (OUTPATIENT)
Dept: GASTROENTEROLOGY | Facility: HOSPITAL | Age: 74
End: 2024-10-15

## 2024-10-15 ENCOUNTER — OUTPATIENT (OUTPATIENT)
Dept: OUTPATIENT SERVICES | Facility: HOSPITAL | Age: 74
LOS: 1 days | End: 2024-10-15
Payer: MEDICARE

## 2024-10-15 ENCOUNTER — TRANSCRIPTION ENCOUNTER (OUTPATIENT)
Age: 74
End: 2024-10-15

## 2024-10-15 VITALS
OXYGEN SATURATION: 99 % | SYSTOLIC BLOOD PRESSURE: 140 MMHG | RESPIRATION RATE: 16 BRPM | DIASTOLIC BLOOD PRESSURE: 69 MMHG | HEART RATE: 56 BPM

## 2024-10-15 VITALS
DIASTOLIC BLOOD PRESSURE: 71 MMHG | WEIGHT: 166.89 LBS | OXYGEN SATURATION: 98 % | RESPIRATION RATE: 18 BRPM | HEART RATE: 57 BPM | HEIGHT: 69 IN | TEMPERATURE: 97 F | SYSTOLIC BLOOD PRESSURE: 157 MMHG

## 2024-10-15 DIAGNOSIS — Z12.11 ENCOUNTER FOR SCREENING FOR MALIGNANT NEOPLASM OF COLON: ICD-10-CM

## 2024-10-15 PROBLEM — Z78.9 OTHER SPECIFIED HEALTH STATUS: Chronic | Status: INACTIVE | Noted: 2023-06-27 | Resolved: 2024-10-15

## 2024-10-15 PROCEDURE — 88305 TISSUE EXAM BY PATHOLOGIST: CPT | Mod: 26

## 2024-10-15 PROCEDURE — 45385 COLONOSCOPY W/LESION REMOVAL: CPT

## 2024-10-15 DEVICE — NET RETRV ROT ROTH 2.5MMX230CM: Type: IMPLANTABLE DEVICE | Status: FUNCTIONAL

## 2024-10-15 RX ORDER — SODIUM CHLORIDE 0.9 % (FLUSH) 0.9 %
500 SYRINGE (ML) INJECTION
Refills: 0 | Status: COMPLETED | OUTPATIENT
Start: 2024-10-15 | End: 2024-10-15

## 2024-10-15 RX ADMIN — Medication 30 MILLILITER(S): at 09:04

## 2024-10-15 NOTE — PRE PROCEDURE NOTE - PRE PROCEDURE EVALUATION
Pre-Endoscopy Evaluation    Attending Physician:  Dr. Rodriguez    Procedure: Colonoscopy    Indication for Procedure: Colon polyp surveillance    PAST MEDICAL & SURGICAL HISTORY:  HTN (hypertension)      HLD (hyperlipidemia)      History of BPH      No significant past surgical history          Allergies    No Known Allergies    Intolerances        Medications: MEDICATIONS  (STANDING):    MEDICATIONS  (PRN):      Pertinent lab data:                      Physical Examination:  Daily Height in cm: 175.26 (15 Oct 2024 08:33)    Daily   Vital Signs Last 24 Hrs  T(C): 36.1 (15 Oct 2024 08:33), Max: 36.1 (15 Oct 2024 08:33)  T(F): 97 (15 Oct 2024 08:33), Max: 97 (15 Oct 2024 08:33)  HR: 57 (15 Oct 2024 08:33) (57 - 57)  BP: 157/71 (15 Oct 2024 08:33) (157/71 - 157/71)  BP(mean): --  RR: 18 (15 Oct 2024 08:33) (18 - 18)  SpO2: 98% (15 Oct 2024 08:33) (98% - 98%)    Parameters below as of 15 Oct 2024 08:33  Patient On (Oxygen Delivery Method): room air      GENERAL: no acute distress  NEURO: alert  HEENT: NCAT, no conjunctival pallor appreciated  CHEST: no respiratory distress, no accessory muscle use  CARDIAC: regular rate, +S1/S2  ABDOMEN: soft, nontender, no rebound or guarding  EXTREMITIES: warm, well perfused  SKIN: no lesions noted    Comments:    ASA Class: I []  II [X]  III []  IV []    The patient is a suitable candidate for the planned procedure unless box checked [ ]  No, explain:                                                                                                     Pre-Endoscopy Evaluation    Attending Physician:  Dr. Rodriguez    Procedure: Colonoscopy    Indication for Procedure: Colon cancer screening    PAST MEDICAL & SURGICAL HISTORY:  HTN (hypertension)      HLD (hyperlipidemia)      History of BPH      No significant past surgical history          Allergies    No Known Allergies    Intolerances        Medications: MEDICATIONS  (STANDING):    MEDICATIONS  (PRN):      Pertinent lab data:                      Physical Examination:  Daily Height in cm: 175.26 (15 Oct 2024 08:33)    Daily   Vital Signs Last 24 Hrs  T(C): 36.1 (15 Oct 2024 08:33), Max: 36.1 (15 Oct 2024 08:33)  T(F): 97 (15 Oct 2024 08:33), Max: 97 (15 Oct 2024 08:33)  HR: 57 (15 Oct 2024 08:33) (57 - 57)  BP: 157/71 (15 Oct 2024 08:33) (157/71 - 157/71)  BP(mean): --  RR: 18 (15 Oct 2024 08:33) (18 - 18)  SpO2: 98% (15 Oct 2024 08:33) (98% - 98%)    Parameters below as of 15 Oct 2024 08:33  Patient On (Oxygen Delivery Method): room air      GENERAL: no acute distress  NEURO: alert  HEENT: NCAT, no conjunctival pallor appreciated  CHEST: no respiratory distress, no accessory muscle use  CARDIAC: regular rate, +S1/S2  ABDOMEN: soft, nontender, no rebound or guarding  EXTREMITIES: warm, well perfused  SKIN: no lesions noted    Comments:    ASA Class: I []  II [X]  III []  IV []    The patient is a suitable candidate for the planned procedure unless box checked [ ]  No, explain:

## 2024-10-15 NOTE — ASU DISCHARGE PLAN (ADULT/PEDIATRIC) - NS MD DC FALL RISK RISK
For information on Fall & Injury Prevention, visit: [email or web address] OR  [email or web address] OR  https://www.cdc.gov/steadi/patient.html

## 2024-10-15 NOTE — ASU PREOP CHECKLIST - INTERNAL PROSTHESES
Daughter Ramonita Mableton here to see her father. Pt continues to be nasally suctioned every 30 min as his voice is very wet and gargly. Pt does gag sometimes but not always. no

## 2024-10-21 LAB — SURGICAL PATHOLOGY STUDY: SIGNIFICANT CHANGE UP

## 2024-10-31 ENCOUNTER — TRANSCRIPTION ENCOUNTER (OUTPATIENT)
Age: 74
End: 2024-10-31

## 2024-11-01 PROBLEM — I10 ESSENTIAL (PRIMARY) HYPERTENSION: Chronic | Status: ACTIVE | Noted: 2024-10-15

## 2024-11-01 PROBLEM — E78.5 HYPERLIPIDEMIA, UNSPECIFIED: Chronic | Status: ACTIVE | Noted: 2024-10-15

## 2024-11-01 PROBLEM — Z87.438 PERSONAL HISTORY OF OTHER DISEASES OF MALE GENITAL ORGANS: Chronic | Status: ACTIVE | Noted: 2024-10-15

## 2024-11-12 ENCOUNTER — APPOINTMENT (OUTPATIENT)
Dept: MRI IMAGING | Facility: CLINIC | Age: 74
End: 2024-11-12

## 2024-11-12 ENCOUNTER — OUTPATIENT (OUTPATIENT)
Dept: OUTPATIENT SERVICES | Facility: HOSPITAL | Age: 74
LOS: 1 days | End: 2024-11-12
Payer: MEDICARE

## 2024-11-12 DIAGNOSIS — K86.1 OTHER CHRONIC PANCREATITIS: ICD-10-CM

## 2024-11-12 PROCEDURE — 74183 MRI ABD W/O CNTR FLWD CNTR: CPT | Mod: 26,MH

## 2024-11-12 PROCEDURE — 74183 MRI ABD W/O CNTR FLWD CNTR: CPT

## 2024-11-12 PROCEDURE — A9585: CPT

## 2024-11-18 NOTE — PRE-ANESTHESIA EVALUATION ADULT - NSATTENDATTESTRD_GEN_ALL_CORE
stated
The patient has been re-examined and I agree with the above assessment or I updated with my findings.

## 2024-11-20 PROCEDURE — 88305 TISSUE EXAM BY PATHOLOGIST: CPT

## 2024-11-20 PROCEDURE — 45385 COLONOSCOPY W/LESION REMOVAL: CPT | Mod: PT

## 2024-11-22 ENCOUNTER — TRANSCRIPTION ENCOUNTER (OUTPATIENT)
Age: 74
End: 2024-11-22

## 2024-11-22 DIAGNOSIS — K86.89 OTHER SPECIFIED DISEASES OF PANCREAS: ICD-10-CM

## 2024-11-26 ENCOUNTER — TRANSCRIPTION ENCOUNTER (OUTPATIENT)
Age: 74
End: 2024-11-26

## 2024-11-27 ENCOUNTER — TRANSCRIPTION ENCOUNTER (OUTPATIENT)
Age: 74
End: 2024-11-27

## 2024-12-18 ENCOUNTER — APPOINTMENT (OUTPATIENT)
Dept: GASTROENTEROLOGY | Facility: CLINIC | Age: 74
End: 2024-12-18

## 2025-01-21 ENCOUNTER — TRANSCRIPTION ENCOUNTER (OUTPATIENT)
Age: 75
End: 2025-01-21

## 2025-01-27 ENCOUNTER — OUTPATIENT (OUTPATIENT)
Dept: OUTPATIENT SERVICES | Facility: HOSPITAL | Age: 75
LOS: 1 days | End: 2025-01-27
Payer: MEDICARE

## 2025-01-27 ENCOUNTER — TRANSCRIPTION ENCOUNTER (OUTPATIENT)
Age: 75
End: 2025-01-27

## 2025-01-27 ENCOUNTER — APPOINTMENT (OUTPATIENT)
Dept: GASTROENTEROLOGY | Facility: HOSPITAL | Age: 75
End: 2025-01-27

## 2025-01-27 VITALS
HEART RATE: 64 BPM | RESPIRATION RATE: 20 BRPM | OXYGEN SATURATION: 98 % | SYSTOLIC BLOOD PRESSURE: 120 MMHG | DIASTOLIC BLOOD PRESSURE: 66 MMHG

## 2025-01-27 VITALS
TEMPERATURE: 97 F | HEART RATE: 64 BPM | SYSTOLIC BLOOD PRESSURE: 170 MMHG | RESPIRATION RATE: 22 BRPM | DIASTOLIC BLOOD PRESSURE: 73 MMHG | HEIGHT: 69 IN | OXYGEN SATURATION: 97 % | WEIGHT: 166.01 LBS

## 2025-01-27 DIAGNOSIS — K86.89 OTHER SPECIFIED DISEASES OF PANCREAS: ICD-10-CM

## 2025-01-27 LAB
A1C WITH ESTIMATED AVERAGE GLUCOSE RESULT: 5.7 % — HIGH (ref 4–5.6)
CANCER AG19-9 SERPL-ACNC: 11 U/ML — SIGNIFICANT CHANGE UP
CEA SERPL-MCNC: 2.1 NG/ML — SIGNIFICANT CHANGE UP (ref 0–3.8)
ESTIMATED AVERAGE GLUCOSE: 117 MG/DL — HIGH (ref 68–114)

## 2025-01-27 PROCEDURE — 43259 EGD US EXAM DUODENUM/JEJUNUM: CPT | Mod: GC

## 2025-01-27 PROCEDURE — 82378 CARCINOEMBRYONIC ANTIGEN: CPT

## 2025-01-27 PROCEDURE — 36415 COLL VENOUS BLD VENIPUNCTURE: CPT

## 2025-01-27 PROCEDURE — 43237 ENDOSCOPIC US EXAM ESOPH: CPT

## 2025-01-27 PROCEDURE — 83036 HEMOGLOBIN GLYCOSYLATED A1C: CPT

## 2025-01-27 PROCEDURE — 86301 IMMUNOASSAY TUMOR CA 19-9: CPT

## 2025-01-27 RX ORDER — BACTERIOSTATIC SODIUM CHLORIDE 0.9 %
500 VIAL (ML) INJECTION
Refills: 0 | Status: COMPLETED | OUTPATIENT
Start: 2025-01-27 | End: 2025-01-27

## 2025-01-27 RX ADMIN — Medication 75 MILLILITER(S): at 09:52

## 2025-01-27 NOTE — ASU DISCHARGE PLAN (ADULT/PEDIATRIC) - NS MD DC FALL RISK RISK
For information on Fall & Injury Prevention, visit: [email or web address] OR  [email or web address] OR  https://www.cdc.gov/steadi/patient.html

## 2025-01-27 NOTE — PRE PROCEDURE NOTE - PRE PROCEDURE EVALUATION
Attending Physician:  Michael                          Procedure: EGD EUS    Indication for Procedure: abnormal pancreatic imaging  ________________________________________________________  PAST MEDICAL & SURGICAL HISTORY:  HTN (hypertension)      HLD (hyperlipidemia)      History of BPH      No significant past surgical history        ALLERGIES:  No Known Allergies    HOME MEDICATIONS:  pantoprazole 40 mg oral delayed release tablet: 1 orally once a day  propranolol 80 mg oral capsule, extended release: 1 orally once a day  rosuvastatin 5 mg oral tablet: 1 orally once a day  tamsulosin 0.4 mg oral capsule: 1 orally once a day    AICD/PPM: [ ] yes   [ ] no    PERTINENT LAB DATA:                      PHYSICAL EXAMINATION:    Height (cm): 175.3  Weight (kg): 75.3  BMI (kg/m2): 24.5  BSA (m2): 1.91T(C): 36.1  HR: 64  BP: 170/73  RR: 22  SpO2: 97%    Constitutional: NAD  HEENT: PERRLA, EOMI,    Neck:  No JVD  Respiratory: CTAB/L  Cardiovascular: S1 and S2  Gastrointestinal: BS+, soft, NT/ND  Extremities: No peripheral edema  Neurological: A/O x 3, no focal deficits  Psychiatric: Normal mood, normal affect  Skin: No rashes    ASA Class: I [ ]  II [ ]  III [ ]  IV [ ]    COMMENTS:    The patient is a suitable candidate for the planned procedure unless box checked [ ]  No, explain:

## 2025-01-27 NOTE — ASU DISCHARGE PLAN (ADULT/PEDIATRIC) - FINANCIAL ASSISTANCE
[facility] provides services at a reduced cost to those who are determined to be eligible through [facility]’s financial assistance program. Information regarding [facility]’s financial assistance program can be found by going to [email or web address] or by calling [phone].

## 2025-01-30 ENCOUNTER — NON-APPOINTMENT (OUTPATIENT)
Age: 75
End: 2025-01-30

## 2025-01-30 ENCOUNTER — APPOINTMENT (OUTPATIENT)
Dept: CARDIOLOGY | Facility: CLINIC | Age: 75
End: 2025-01-30
Payer: MEDICARE

## 2025-01-30 VITALS
HEART RATE: 65 BPM | BODY MASS INDEX: 24.73 KG/M2 | DIASTOLIC BLOOD PRESSURE: 76 MMHG | SYSTOLIC BLOOD PRESSURE: 130 MMHG | WEIGHT: 167 LBS | OXYGEN SATURATION: 99 % | HEIGHT: 69 IN

## 2025-01-30 DIAGNOSIS — G25.0 ESSENTIAL TREMOR: ICD-10-CM

## 2025-01-30 DIAGNOSIS — I10 ESSENTIAL (PRIMARY) HYPERTENSION: ICD-10-CM

## 2025-01-30 DIAGNOSIS — E78.5 HYPERLIPIDEMIA, UNSPECIFIED: ICD-10-CM

## 2025-01-30 DIAGNOSIS — R06.09 OTHER FORMS OF DYSPNEA: ICD-10-CM

## 2025-01-30 DIAGNOSIS — K86.89 OTHER SPECIFIED DISEASES OF PANCREAS: ICD-10-CM

## 2025-01-30 PROCEDURE — 93000 ELECTROCARDIOGRAM COMPLETE: CPT

## 2025-01-30 PROCEDURE — 99204 OFFICE O/P NEW MOD 45 MIN: CPT

## 2025-01-30 RX ORDER — CLOTRIMAZOLE AND BETAMETHASONE DIPROPIONATE 10; .5 MG/G; MG/G
1-0.05 CREAM TOPICAL
Qty: 45 | Refills: 0 | Status: ACTIVE | COMMUNITY
Start: 2025-01-15

## 2025-02-10 ENCOUNTER — APPOINTMENT (OUTPATIENT)
Dept: UROLOGY | Facility: CLINIC | Age: 75
End: 2025-02-10

## 2025-02-13 ENCOUNTER — APPOINTMENT (OUTPATIENT)
Dept: CARDIOLOGY | Facility: CLINIC | Age: 75
End: 2025-02-13
Payer: MEDICARE

## 2025-02-13 PROCEDURE — 93306 TTE W/DOPPLER COMPLETE: CPT

## 2025-02-19 ENCOUNTER — APPOINTMENT (OUTPATIENT)
Dept: CV DIAGNOSTICS | Facility: HOSPITAL | Age: 75
End: 2025-02-19

## 2025-02-19 ENCOUNTER — RESULT REVIEW (OUTPATIENT)
Age: 75
End: 2025-02-19

## 2025-02-19 ENCOUNTER — OUTPATIENT (OUTPATIENT)
Dept: OUTPATIENT SERVICES | Facility: HOSPITAL | Age: 75
LOS: 1 days | End: 2025-02-19
Payer: MEDICARE

## 2025-02-19 DIAGNOSIS — R06.09 OTHER FORMS OF DYSPNEA: ICD-10-CM

## 2025-02-19 PROCEDURE — 93016 CV STRESS TEST SUPVJ ONLY: CPT

## 2025-02-19 PROCEDURE — 93017 CV STRESS TEST TRACING ONLY: CPT

## 2025-02-19 PROCEDURE — 93018 CV STRESS TEST I&R ONLY: CPT

## 2025-02-25 ENCOUNTER — APPOINTMENT (OUTPATIENT)
Dept: UROLOGY | Facility: CLINIC | Age: 75
End: 2025-02-25
Payer: MEDICARE

## 2025-02-25 VITALS — SYSTOLIC BLOOD PRESSURE: 125 MMHG | DIASTOLIC BLOOD PRESSURE: 73 MMHG

## 2025-02-25 DIAGNOSIS — N40.0 BENIGN PROSTATIC HYPERPLASIA WITHOUT LOWER URINARY TRACT SYMPMS: ICD-10-CM

## 2025-02-25 DIAGNOSIS — Z12.5 ENCOUNTER FOR SCREENING FOR MALIGNANT NEOPLASM OF PROSTATE: ICD-10-CM

## 2025-02-25 DIAGNOSIS — R35.0 FREQUENCY OF MICTURITION: ICD-10-CM

## 2025-02-25 PROCEDURE — 99214 OFFICE O/P EST MOD 30 MIN: CPT

## 2025-03-03 ENCOUNTER — OUTPATIENT (OUTPATIENT)
Dept: OUTPATIENT SERVICES | Facility: HOSPITAL | Age: 75
LOS: 1 days | End: 2025-03-03
Payer: MEDICARE

## 2025-03-03 ENCOUNTER — APPOINTMENT (OUTPATIENT)
Dept: CT IMAGING | Facility: CLINIC | Age: 75
End: 2025-03-03
Payer: MEDICARE

## 2025-03-03 DIAGNOSIS — R93.1 ABNORMAL FINDINGS ON DIAGNOSTIC IMAGING OF HEART AND CORONARY CIRCULATION: ICD-10-CM

## 2025-03-03 DIAGNOSIS — I10 ESSENTIAL (PRIMARY) HYPERTENSION: ICD-10-CM

## 2025-03-03 PROBLEM — I25.10 CORONARY ARTERIOSCLEROSIS: Status: ACTIVE | Noted: 2025-03-03

## 2025-03-03 PROCEDURE — 75574 CT ANGIO HRT W/3D IMAGE: CPT | Mod: 26

## 2025-03-03 PROCEDURE — 75574 CT ANGIO HRT W/3D IMAGE: CPT

## 2025-03-04 ENCOUNTER — TRANSCRIPTION ENCOUNTER (OUTPATIENT)
Age: 75
End: 2025-03-04

## 2025-03-04 ENCOUNTER — OUTPATIENT (OUTPATIENT)
Dept: OUTPATIENT SERVICES | Facility: HOSPITAL | Age: 75
LOS: 1 days | End: 2025-03-04
Payer: MEDICARE

## 2025-03-04 ENCOUNTER — RESULT REVIEW (OUTPATIENT)
Age: 75
End: 2025-03-04

## 2025-03-04 DIAGNOSIS — Z00.8 ENCOUNTER FOR OTHER GENERAL EXAMINATION: ICD-10-CM

## 2025-03-04 PROCEDURE — 75580 N-INVAS EST C FFR SW ALY CTA: CPT | Mod: 26

## 2025-03-04 PROCEDURE — 75580 N-INVAS EST C FFR SW ALY CTA: CPT

## 2025-03-05 ENCOUNTER — TRANSCRIPTION ENCOUNTER (OUTPATIENT)
Age: 75
End: 2025-03-05

## 2025-03-07 ENCOUNTER — TRANSCRIPTION ENCOUNTER (OUTPATIENT)
Age: 75
End: 2025-03-07

## 2025-03-07 LAB — PSA SERPL-MCNC: 1.08 NG/ML

## 2025-03-12 ENCOUNTER — OUTPATIENT (OUTPATIENT)
Dept: OUTPATIENT SERVICES | Facility: HOSPITAL | Age: 75
LOS: 1 days | End: 2025-03-12
Payer: MEDICARE

## 2025-03-12 ENCOUNTER — TRANSCRIPTION ENCOUNTER (OUTPATIENT)
Age: 75
End: 2025-03-12

## 2025-03-12 VITALS
OXYGEN SATURATION: 98 % | HEART RATE: 59 BPM | TEMPERATURE: 98 F | DIASTOLIC BLOOD PRESSURE: 65 MMHG | SYSTOLIC BLOOD PRESSURE: 145 MMHG | RESPIRATION RATE: 18 BRPM

## 2025-03-12 VITALS
OXYGEN SATURATION: 100 % | DIASTOLIC BLOOD PRESSURE: 87 MMHG | HEART RATE: 63 BPM | RESPIRATION RATE: 18 BRPM | TEMPERATURE: 98 F | SYSTOLIC BLOOD PRESSURE: 138 MMHG

## 2025-03-12 DIAGNOSIS — I25.10 ATHEROSCLEROTIC HEART DISEASE OF NATIVE CORONARY ARTERY W/OUT ANGINA PECTORIS: ICD-10-CM

## 2025-03-12 DIAGNOSIS — R93.1 ABNORMAL FINDINGS ON DIAGNOSTIC IMAGING OF HEART AND CORONARY CIRCULATION: ICD-10-CM

## 2025-03-12 LAB
ANION GAP SERPL CALC-SCNC: 14 MMOL/L — SIGNIFICANT CHANGE UP (ref 5–17)
BUN SERPL-MCNC: 13 MG/DL — SIGNIFICANT CHANGE UP (ref 7–23)
CALCIUM SERPL-MCNC: 10.1 MG/DL — SIGNIFICANT CHANGE UP (ref 8.4–10.5)
CHLORIDE SERPL-SCNC: 100 MMOL/L — SIGNIFICANT CHANGE UP (ref 96–108)
CO2 SERPL-SCNC: 23 MMOL/L — SIGNIFICANT CHANGE UP (ref 22–31)
CREAT SERPL-MCNC: 0.81 MG/DL — SIGNIFICANT CHANGE UP (ref 0.5–1.3)
EGFR: 93 ML/MIN/1.73M2 — SIGNIFICANT CHANGE UP
EGFR: 93 ML/MIN/1.73M2 — SIGNIFICANT CHANGE UP
GLUCOSE SERPL-MCNC: 90 MG/DL — SIGNIFICANT CHANGE UP (ref 70–99)
HCT VFR BLD CALC: 42.2 % — SIGNIFICANT CHANGE UP (ref 39–50)
HGB BLD-MCNC: 14.2 G/DL — SIGNIFICANT CHANGE UP (ref 13–17)
MCHC RBC-ENTMCNC: 31.6 PG — SIGNIFICANT CHANGE UP (ref 27–34)
MCHC RBC-ENTMCNC: 33.6 G/DL — SIGNIFICANT CHANGE UP (ref 32–36)
MCV RBC AUTO: 94 FL — SIGNIFICANT CHANGE UP (ref 80–100)
NRBC BLD AUTO-RTO: 0 /100 WBCS — SIGNIFICANT CHANGE UP (ref 0–0)
PLATELET # BLD AUTO: 254 K/UL — SIGNIFICANT CHANGE UP (ref 150–400)
POTASSIUM SERPL-MCNC: 4.7 MMOL/L — SIGNIFICANT CHANGE UP (ref 3.5–5.3)
POTASSIUM SERPL-SCNC: 4.7 MMOL/L — SIGNIFICANT CHANGE UP (ref 3.5–5.3)
RBC # BLD: 4.49 M/UL — SIGNIFICANT CHANGE UP (ref 4.2–5.8)
RBC # FLD: 11.9 % — SIGNIFICANT CHANGE UP (ref 10.3–14.5)
SODIUM SERPL-SCNC: 137 MMOL/L — SIGNIFICANT CHANGE UP (ref 135–145)
WBC # BLD: 10.99 K/UL — HIGH (ref 3.8–10.5)
WBC # FLD AUTO: 10.99 K/UL — HIGH (ref 3.8–10.5)

## 2025-03-12 PROCEDURE — 92928 PRQ TCAT PLMT NTRAC ST 1 LES: CPT | Mod: LD

## 2025-03-12 PROCEDURE — 99152 MOD SED SAME PHYS/QHP 5/>YRS: CPT

## 2025-03-12 PROCEDURE — 93454 CORONARY ARTERY ANGIO S&I: CPT | Mod: 26,59

## 2025-03-12 PROCEDURE — 36415 COLL VENOUS BLD VENIPUNCTURE: CPT

## 2025-03-12 PROCEDURE — C1725: CPT

## 2025-03-12 PROCEDURE — 85027 COMPLETE CBC AUTOMATED: CPT

## 2025-03-12 PROCEDURE — C1887: CPT

## 2025-03-12 PROCEDURE — C1769: CPT

## 2025-03-12 PROCEDURE — 80048 BASIC METABOLIC PNL TOTAL CA: CPT

## 2025-03-12 PROCEDURE — 93010 ELECTROCARDIOGRAM REPORT: CPT | Mod: 76

## 2025-03-12 PROCEDURE — C1874: CPT

## 2025-03-12 PROCEDURE — C9600: CPT | Mod: LD

## 2025-03-12 PROCEDURE — C1894: CPT

## 2025-03-12 PROCEDURE — 93005 ELECTROCARDIOGRAM TRACING: CPT

## 2025-03-12 PROCEDURE — 93454 CORONARY ARTERY ANGIO S&I: CPT | Mod: 59

## 2025-03-12 RX ORDER — PROPRANOLOL HCL 60 MG
80 TABLET ORAL DAILY
Refills: 0 | Status: DISCONTINUED | OUTPATIENT
Start: 2025-03-12 | End: 2025-03-26

## 2025-03-12 RX ORDER — ASPIRIN 325 MG
1 TABLET ORAL
Qty: 30 | Refills: 0
Start: 2025-03-12 | End: 2025-04-10

## 2025-03-12 RX ORDER — LOSARTAN POTASSIUM 100 MG/1
25 TABLET, FILM COATED ORAL DAILY
Refills: 0 | Status: DISCONTINUED | OUTPATIENT
Start: 2025-03-12 | End: 2025-03-26

## 2025-03-12 RX ORDER — TAMSULOSIN HYDROCHLORIDE 0.4 MG/1
0.4 CAPSULE ORAL AT BEDTIME
Refills: 0 | Status: DISCONTINUED | OUTPATIENT
Start: 2025-03-12 | End: 2025-03-26

## 2025-03-12 RX ORDER — OXYBUTYNIN CHLORIDE 5 MG/1
5 TABLET, FILM COATED, EXTENDED RELEASE ORAL
Refills: 0 | Status: DISCONTINUED | OUTPATIENT
Start: 2025-03-12 | End: 2025-03-26

## 2025-03-12 RX ORDER — ROSUVASTATIN CALCIUM 20 MG/1
5 TABLET, FILM COATED ORAL AT BEDTIME
Refills: 0 | Status: DISCONTINUED | OUTPATIENT
Start: 2025-03-12 | End: 2025-03-26

## 2025-03-12 RX ORDER — TELMISARTAN 20 MG/1
1 TABLET ORAL
Refills: 0 | DISCHARGE

## 2025-03-12 RX ORDER — SOLIFENACIN SUCCIATE 5 MG/1
1 TABLET, FILM COATED ORAL
Refills: 0 | DISCHARGE

## 2025-03-12 RX ORDER — CLOPIDOGREL BISULFATE 75 MG/1
1 TABLET, FILM COATED ORAL
Qty: 90 | Refills: 0
Start: 2025-03-12 | End: 2025-06-09

## 2025-03-12 NOTE — ASU DISCHARGE PLAN (ADULT/PEDIATRIC) - CARE PROVIDER_API CALL
Debbie Nielsen  Cardiovascular Disease  [address] [place] [id number]  Phone: [phone]  Fax: [phone]  Follow Up Time: 2 weeks

## 2025-03-12 NOTE — ASU DISCHARGE PLAN (ADULT/PEDIATRIC) - FINANCIAL ASSISTANCE
[facility] provides services at a reduced cost to those who are determined to be eligible through [facility]’s financial assistance program. Information regarding [facility]’s financial assistance program can be found by going to [email or web address] or by calling [phone].

## 2025-03-12 NOTE — ASU DISCHARGE PLAN (ADULT/PEDIATRIC) - ASU DC SPECIAL INSTRUCTIONSFT
Follow instructions as directed    We have provided you with a prescription for cardiac rehab which is medically supervised exercise program for your heart and has been shown to improve the quantity and quality of life of people with heart disease like yours. You should attend cardiac rehab 3 times per week for 12 weeks. We have provided you with a list of nearby facilities. Please call your insurance carrier to determine which of these facilities are covered under your plan. Please bring this prescription with you to your follow up appointment with your cardiologist who can then further assist you to enroll into a cardiac rehab program.

## 2025-03-12 NOTE — ASU PATIENT PROFILE, ADULT - VISION (WITH CORRECTIVE LENSES IF THE PATIENT USUALLY WEARS THEM):
PAST SURGICAL HISTORY:  History of lung surgery     
Normal vision: sees adequately in most situations; can see medication labels, newsprint

## 2025-03-12 NOTE — ASU DISCHARGE PLAN (ADULT/PEDIATRIC) - NS MD DC FALL RISK RISK
For information on Fall & Injury Prevention, visit: [email or web address] OR  [email or web address] OR  https://www.cdc.gov/steadi/patient.html

## 2025-03-12 NOTE — H&P CARDIOLOGY - HISTORY OF PRESENT ILLNESS
74-year-old gentleman HTN, HLD (Vanessa Esquivel - wife) here to establish care. Swims competitively andfeels well. However, several friends have had CAD recently. BP elevated over the last year and started on meds. h/o pancreatitis recent EUS with sludge in gallbladder.     Exercise stress test - 1. The ECG had nonspecific ST changes with exercise that did not meet diagnostic criteria for ischemia,  thus overall is low risk for ischemia.  2. The patient underwent stress testing using the standard Aaron protocol.  • The patient exercised for 8 min 0 sec.  • The test was stopped due to fatigue.  • The patient achieved 10.10 METS which is consistent with good exercise capacity.  3. Baseline electrocardiogram: normal sinus rhythm.    ECHO -   1.Left ventricular systolic function is normal with an ejection fraction of 58 % by Barber's method of disks. There are no regional wall motion abnormalities seen.  2.Reversal of the mitral E/A ratio consistent with reduced LV compliance  .3.There is calcification of the aortic valve leaflets. No aortic valve stenosis.  4.Trace aortic regurgitation.  5.Mild prolapse of both mitral leaflets.  6.There is calcification of the mitral valve annulus.  7.Mild mitral regurgitation.  8.No echocardiographic evidence of pulmonary hypertension 74-year-old gentleman HTN, HLD (Vanessa Esquivel - wife) here to establish care. Swims competitively andfeels well. However, several friends have had CAD recently. BP elevated over the last year and started on meds. h/o pancreatitis recent EUS with sludge in gallbladder.     Exercise stress test - 1. The ECG had nonspecific ST changes with exercise that did not meet diagnostic criteria for ischemia,  thus overall is low risk for ischemia.  2. The patient underwent stress testing using the standard Aaron protocol.  • The patient exercised for 8 min 0 sec.  • The test was stopped due to fatigue.  • The patient achieved 10.10 METS which is consistent with good exercise capacity.  3. Baseline electrocardiogram: normal sinus rhythm.    ECHO -   1.Left ventricular systolic function is normal with an ejection fraction of 58 % by Barber's method of disks. There are no regional wall motion abnormalities seen.  2.Reversal of the mitral E/A ratio consistent with reduced LV compliance  .3.There is calcification of the aortic valve leaflets. No aortic valve stenosis.  4.Trace aortic regurgitation.  5.Mild prolapse of both mitral leaflets.  6.There is calcification of the mitral valve annulus.  7.Mild mitral regurgitation.  8.No echocardiographic evidence of pulmonary hypertension    He presents for [facility] .

## 2025-03-12 NOTE — ASU PATIENT PROFILE, ADULT - FALL HARM RISK - UNIVERSAL INTERVENTIONS
Bed in lowest position, wheels locked, appropriate side rails in place/Call bell, personal items and telephone in reach/Instruct patient to call for assistance before getting out of bed or chair/Non-slip footwear when patient is out of bed/[place] to call system/Physically safe environment - no spills, clutter or unnecessary equipment/Purposeful Proactive Rounding/Room/bathroom lighting operational, light cord in reach

## 2025-03-12 NOTE — H&P CARDIOLOGY - NEUROLOGICAL
Alert & oriented; no sensory, motor or coordination deficits, normal reflexes negative Render Risk Assessment In Note?: yes Detail Level: Simple Additional Notes: Patient consent was obtained to proceed with the visit and recommended plan of care after discussion of all risks and benefits, including the risks of COVID-19 exposure.

## 2025-03-12 NOTE — CHART NOTE - NSCHARTNOTEFT_GEN_A_CORE
Cardiac Rehab (STEMI/NSTEMI/ACS/Unstable Angina/CHF/Chronic Stable Angina/Heart Surgery (CABG,Valve)/Post PCI):              *Education on benefits of Cardiac Rehab provided to patient: Yes         *Referral and Prescription Given for Cardiac Rehab : Yes         *Pt given list of locations & instructed to contact their insurance company to review list of participating providers: Yes         *Pt instructed to bring Cardiac Rehab prescription with them to Cardiology Follow up appointment for assistance with enrollment: Yes         *Pt discharged with copies detail cardiovascular history, medications, testing/treatments: Yes
Cardiac Rehab (STEMI/NSTEMI/ACS/Unstable Angina/CHF/Chronic Stable Angina/Heart Surgery (CABG,Valve)/Post PCI):              *Education on benefits of Cardiac Rehab provided to patient: Yes         *Referral and Prescription Given for Cardiac Rehab : Yes         *Pt given list of locations & instructed to contact their insurance company to review list of participating providers: Yes         *Pt instructed to bring Cardiac Rehab prescription with them to Cardiology Follow up appointment for assistance with enrollment: Yes         *Pt discharged with copies detail cardiovascular history, medications, testing/treatments: Yes

## 2025-03-14 ENCOUNTER — TRANSCRIPTION ENCOUNTER (OUTPATIENT)
Age: 75
End: 2025-03-14

## 2025-03-14 RX ORDER — CLOPIDOGREL BISULFATE 75 MG/1
75 TABLET, FILM COATED ORAL
Qty: 90 | Refills: 3 | Status: ACTIVE | COMMUNITY
Start: 2025-03-14 | End: 1900-01-01

## 2025-03-17 ENCOUNTER — TRANSCRIPTION ENCOUNTER (OUTPATIENT)
Age: 75
End: 2025-03-17

## 2025-03-27 ENCOUNTER — NON-APPOINTMENT (OUTPATIENT)
Age: 75
End: 2025-03-27

## 2025-03-27 ENCOUNTER — APPOINTMENT (OUTPATIENT)
Dept: CARDIOLOGY | Facility: CLINIC | Age: 75
End: 2025-03-27
Payer: MEDICARE

## 2025-03-27 VITALS
HEIGHT: 69 IN | WEIGHT: 170 LBS | HEART RATE: 57 BPM | SYSTOLIC BLOOD PRESSURE: 120 MMHG | OXYGEN SATURATION: 99 % | DIASTOLIC BLOOD PRESSURE: 73 MMHG | BODY MASS INDEX: 25.18 KG/M2

## 2025-03-27 DIAGNOSIS — I10 ESSENTIAL (PRIMARY) HYPERTENSION: ICD-10-CM

## 2025-03-27 DIAGNOSIS — E78.5 HYPERLIPIDEMIA, UNSPECIFIED: ICD-10-CM

## 2025-03-27 DIAGNOSIS — I25.10 ATHEROSCLEROTIC HEART DISEASE OF NATIVE CORONARY ARTERY W/OUT ANGINA PECTORIS: ICD-10-CM

## 2025-03-27 PROCEDURE — 93000 ELECTROCARDIOGRAM COMPLETE: CPT

## 2025-03-27 PROCEDURE — G2211 COMPLEX E/M VISIT ADD ON: CPT

## 2025-03-27 PROCEDURE — 99214 OFFICE O/P EST MOD 30 MIN: CPT

## 2025-04-01 ENCOUNTER — TRANSCRIPTION ENCOUNTER (OUTPATIENT)
Age: 75
End: 2025-04-01

## 2025-04-23 ENCOUNTER — OUTPATIENT (OUTPATIENT)
Dept: OUTPATIENT SERVICES | Facility: HOSPITAL | Age: 75
LOS: 1 days | End: 2025-04-23
Payer: MEDICARE

## 2025-04-23 ENCOUNTER — APPOINTMENT (OUTPATIENT)
Dept: MRI IMAGING | Facility: CLINIC | Age: 75
End: 2025-04-23

## 2025-04-23 DIAGNOSIS — Z00.8 ENCOUNTER FOR OTHER GENERAL EXAMINATION: ICD-10-CM

## 2025-04-23 PROCEDURE — A9585: CPT

## 2025-04-23 PROCEDURE — 74183 MRI ABD W/O CNTR FLWD CNTR: CPT | Mod: 26

## 2025-04-23 PROCEDURE — 74183 MRI ABD W/O CNTR FLWD CNTR: CPT

## 2025-04-30 ENCOUNTER — NON-APPOINTMENT (OUTPATIENT)
Age: 75
End: 2025-04-30

## 2025-05-01 ENCOUNTER — TRANSCRIPTION ENCOUNTER (OUTPATIENT)
Age: 75
End: 2025-05-01

## 2025-05-01 PROBLEM — E07.9 THYROID DISORDER: Status: ACTIVE | Noted: 2025-05-01

## 2025-05-01 PROBLEM — E53.8 VITAMIN B12 DEFICIENCY: Status: ACTIVE | Noted: 2025-05-01

## 2025-05-01 PROBLEM — R73.03 PREDIABETES: Status: ACTIVE | Noted: 2025-05-01

## 2025-05-02 ENCOUNTER — TRANSCRIPTION ENCOUNTER (OUTPATIENT)
Age: 75
End: 2025-05-02

## 2025-05-05 ENCOUNTER — APPOINTMENT (OUTPATIENT)
Dept: INTERNAL MEDICINE | Facility: CLINIC | Age: 75
End: 2025-05-05
Payer: MEDICARE

## 2025-05-05 VITALS — HEIGHT: 69 IN | BODY MASS INDEX: 25.33 KG/M2 | OXYGEN SATURATION: 98 % | HEART RATE: 69 BPM | WEIGHT: 171 LBS

## 2025-05-05 VITALS — DIASTOLIC BLOOD PRESSURE: 76 MMHG | SYSTOLIC BLOOD PRESSURE: 115 MMHG

## 2025-05-05 DIAGNOSIS — Z00.00 ENCOUNTER FOR GENERAL ADULT MEDICAL EXAMINATION W/OUT ABNORMAL FINDINGS: ICD-10-CM

## 2025-05-05 DIAGNOSIS — Z95.5 PRESENCE OF CORONARY ANGIOPLASTY IMPLANT AND GRAFT: ICD-10-CM

## 2025-05-05 DIAGNOSIS — K86.1 OTHER CHRONIC PANCREATITIS: ICD-10-CM

## 2025-05-05 DIAGNOSIS — E78.5 HYPERLIPIDEMIA, UNSPECIFIED: ICD-10-CM

## 2025-05-05 DIAGNOSIS — I10 ESSENTIAL (PRIMARY) HYPERTENSION: ICD-10-CM

## 2025-05-05 DIAGNOSIS — R73.03 PREDIABETES.: ICD-10-CM

## 2025-05-05 LAB
25(OH)D3 SERPL-MCNC: 38.9 NG/ML
ALBUMIN SERPL ELPH-MCNC: 4.6 G/DL
ALP BLD-CCNC: 56 U/L
ALT SERPL-CCNC: 25 U/L
ANION GAP SERPL CALC-SCNC: 14 MMOL/L
APO LP(A) SERPL-MCNC: <9 NMOL/L
APPEARANCE: CLEAR
AST SERPL-CCNC: 25 U/L
BASOPHILS # BLD AUTO: 0.04 K/UL
BASOPHILS NFR BLD AUTO: 0.7 %
BILIRUB SERPL-MCNC: 0.4 MG/DL
BILIRUBIN URINE: NEGATIVE
BLOOD URINE: NEGATIVE
BUN SERPL-MCNC: 14 MG/DL
CALCIUM SERPL-MCNC: 9.2 MG/DL
CHLORIDE SERPL-SCNC: 102 MMOL/L
CHOLEST SERPL-MCNC: 120 MG/DL
CK SERPL-CCNC: 196 U/L
CO2 SERPL-SCNC: 21 MMOL/L
COLOR: YELLOW
CREAT SERPL-MCNC: 0.86 MG/DL
EGFRCR SERPLBLD CKD-EPI 2021: 91 ML/MIN/1.73M2
EOSINOPHIL # BLD AUTO: 0.14 K/UL
EOSINOPHIL NFR BLD AUTO: 2.4 %
ESTIMATED AVERAGE GLUCOSE: 123 MG/DL
GLUCOSE QUALITATIVE U: NEGATIVE MG/DL
GLUCOSE SERPL-MCNC: 117 MG/DL
HBA1C MFR BLD HPLC: 5.9 %
HCT VFR BLD CALC: 38.5 %
HDLC SERPL-MCNC: 52 MG/DL
HGB BLD-MCNC: 13.1 G/DL
IMM GRANULOCYTES NFR BLD AUTO: 0.3 %
KETONES URINE: NEGATIVE MG/DL
LDLC SERPL-MCNC: 57 MG/DL
LEUKOCYTE ESTERASE URINE: NEGATIVE
LPL SERPL-CCNC: 29 U/L
LYMPHOCYTES # BLD AUTO: 1.72 K/UL
LYMPHOCYTES NFR BLD AUTO: 29 %
MAN DIFF?: NORMAL
MCHC RBC-ENTMCNC: 31.9 PG
MCHC RBC-ENTMCNC: 34 G/DL
MCV RBC AUTO: 93.7 FL
MONOCYTES # BLD AUTO: 0.64 K/UL
MONOCYTES NFR BLD AUTO: 10.8 %
NEUTROPHILS # BLD AUTO: 3.38 K/UL
NEUTROPHILS NFR BLD AUTO: 56.8 %
NITRITE URINE: NEGATIVE
NONHDLC SERPL-MCNC: 68 MG/DL
PH URINE: 7
PLATELET # BLD AUTO: 248 K/UL
POTASSIUM SERPL-SCNC: 4.4 MMOL/L
PROT SERPL-MCNC: 6.7 G/DL
PROTEIN URINE: NEGATIVE MG/DL
PSA SERPL-MCNC: 0.92 NG/ML
RBC # BLD: 4.11 M/UL
RBC # FLD: 12.3 %
SODIUM SERPL-SCNC: 137 MMOL/L
SPECIFIC GRAVITY URINE: 1.02
T4 FREE SERPL-MCNC: 1.3 NG/DL
TRIGL SERPL-MCNC: 45 MG/DL
TSH SERPL-ACNC: 0.9 UIU/ML
UROBILINOGEN URINE: 0.2 MG/DL
VIT B12 SERPL-MCNC: 536 PG/ML
WBC # FLD AUTO: 5.94 K/UL

## 2025-05-05 PROCEDURE — G2211 COMPLEX E/M VISIT ADD ON: CPT

## 2025-05-05 PROCEDURE — 99214 OFFICE O/P EST MOD 30 MIN: CPT | Mod: 25

## 2025-05-05 PROCEDURE — G0439: CPT

## 2025-05-06 ENCOUNTER — TRANSCRIPTION ENCOUNTER (OUTPATIENT)
Age: 75
End: 2025-05-06

## 2025-05-07 ENCOUNTER — TRANSCRIPTION ENCOUNTER (OUTPATIENT)
Age: 75
End: 2025-05-07

## 2025-05-13 ENCOUNTER — TRANSCRIPTION ENCOUNTER (OUTPATIENT)
Age: 75
End: 2025-05-13

## 2025-05-14 ENCOUNTER — TRANSCRIPTION ENCOUNTER (OUTPATIENT)
Age: 75
End: 2025-05-14

## 2025-05-14 ENCOUNTER — NON-APPOINTMENT (OUTPATIENT)
Age: 75
End: 2025-05-14

## 2025-05-15 ENCOUNTER — APPOINTMENT (OUTPATIENT)
Dept: CARDIOLOGY | Facility: CLINIC | Age: 75
End: 2025-05-15
Payer: MEDICARE

## 2025-05-15 ENCOUNTER — NON-APPOINTMENT (OUTPATIENT)
Age: 75
End: 2025-05-15

## 2025-05-15 VITALS
HEART RATE: 62 BPM | SYSTOLIC BLOOD PRESSURE: 120 MMHG | WEIGHT: 171 LBS | HEIGHT: 69 IN | RESPIRATION RATE: 12 BRPM | OXYGEN SATURATION: 98 % | DIASTOLIC BLOOD PRESSURE: 72 MMHG | BODY MASS INDEX: 25.33 KG/M2

## 2025-05-15 DIAGNOSIS — I25.10 ATHEROSCLEROTIC HEART DISEASE OF NATIVE CORONARY ARTERY W/OUT ANGINA PECTORIS: ICD-10-CM

## 2025-05-15 PROBLEM — R55 SYNCOPE AND COLLAPSE: Status: ACTIVE | Noted: 2025-05-15

## 2025-05-15 PROCEDURE — G2211 COMPLEX E/M VISIT ADD ON: CPT

## 2025-05-15 PROCEDURE — 93000 ELECTROCARDIOGRAM COMPLETE: CPT

## 2025-05-15 PROCEDURE — 99214 OFFICE O/P EST MOD 30 MIN: CPT

## 2025-05-16 ENCOUNTER — TRANSCRIPTION ENCOUNTER (OUTPATIENT)
Age: 75
End: 2025-05-16

## 2025-05-16 ENCOUNTER — APPOINTMENT (OUTPATIENT)
Dept: CARDIOLOGY | Facility: CLINIC | Age: 75
End: 2025-05-16
Payer: MEDICARE

## 2025-05-16 PROCEDURE — 93880 EXTRACRANIAL BILAT STUDY: CPT

## 2025-05-19 ENCOUNTER — TRANSCRIPTION ENCOUNTER (OUTPATIENT)
Age: 75
End: 2025-05-19

## 2025-05-19 ENCOUNTER — NON-APPOINTMENT (OUTPATIENT)
Age: 75
End: 2025-05-19

## 2025-05-20 ENCOUNTER — NON-APPOINTMENT (OUTPATIENT)
Age: 75
End: 2025-05-20

## 2025-05-21 ENCOUNTER — TRANSCRIPTION ENCOUNTER (OUTPATIENT)
Age: 75
End: 2025-05-21

## 2025-05-21 ENCOUNTER — APPOINTMENT (OUTPATIENT)
Dept: INTERNAL MEDICINE | Facility: CLINIC | Age: 75
End: 2025-05-21
Payer: MEDICARE

## 2025-05-21 VITALS — DIASTOLIC BLOOD PRESSURE: 76 MMHG | SYSTOLIC BLOOD PRESSURE: 120 MMHG

## 2025-05-21 VITALS
HEART RATE: 69 BPM | DIASTOLIC BLOOD PRESSURE: 84 MMHG | OXYGEN SATURATION: 99 % | WEIGHT: 168 LBS | SYSTOLIC BLOOD PRESSURE: 124 MMHG | BODY MASS INDEX: 24.88 KG/M2 | HEIGHT: 69 IN

## 2025-05-21 VITALS — SYSTOLIC BLOOD PRESSURE: 120 MMHG | DIASTOLIC BLOOD PRESSURE: 74 MMHG

## 2025-05-21 DIAGNOSIS — R55 SYNCOPE AND COLLAPSE: ICD-10-CM

## 2025-05-21 DIAGNOSIS — Z95.5 PRESENCE OF CORONARY ANGIOPLASTY IMPLANT AND GRAFT: ICD-10-CM

## 2025-05-21 DIAGNOSIS — R93.1 ABNORMAL FINDINGS ON DIAGNOSTIC IMAGING OF HEART AND CORONARY CIRCULATION: ICD-10-CM

## 2025-05-21 DIAGNOSIS — I10 ESSENTIAL (PRIMARY) HYPERTENSION: ICD-10-CM

## 2025-05-21 DIAGNOSIS — F07.81 POSTCONCUSSIONAL SYNDROME: ICD-10-CM

## 2025-05-21 DIAGNOSIS — S02.85XA FX OF ORBIT, UNSP, INT ENC FOR CL: ICD-10-CM

## 2025-05-21 PROCEDURE — 99214 OFFICE O/P EST MOD 30 MIN: CPT

## 2025-05-21 PROCEDURE — G2211 COMPLEX E/M VISIT ADD ON: CPT

## 2025-05-29 ENCOUNTER — NON-APPOINTMENT (OUTPATIENT)
Age: 75
End: 2025-05-29

## 2025-06-02 ENCOUNTER — TRANSCRIPTION ENCOUNTER (OUTPATIENT)
Age: 75
End: 2025-06-02

## 2025-06-02 ENCOUNTER — OUTPATIENT (OUTPATIENT)
Dept: OUTPATIENT SERVICES | Facility: HOSPITAL | Age: 75
LOS: 1 days | End: 2025-06-02
Payer: MEDICARE

## 2025-06-02 VITALS
OXYGEN SATURATION: 100 % | TEMPERATURE: 98 F | SYSTOLIC BLOOD PRESSURE: 129 MMHG | RESPIRATION RATE: 18 BRPM | HEART RATE: 56 BPM | DIASTOLIC BLOOD PRESSURE: 73 MMHG

## 2025-06-02 VITALS — WEIGHT: 166.89 LBS | HEIGHT: 69 IN

## 2025-06-02 DIAGNOSIS — R55 SYNCOPE AND COLLAPSE: ICD-10-CM

## 2025-06-02 LAB
ANION GAP SERPL CALC-SCNC: 13 MMOL/L — SIGNIFICANT CHANGE UP (ref 5–17)
BLD GP AB SCN SERPL QL: NEGATIVE — SIGNIFICANT CHANGE UP
BUN SERPL-MCNC: 17 MG/DL — SIGNIFICANT CHANGE UP (ref 7–23)
CALCIUM SERPL-MCNC: 9.5 MG/DL — SIGNIFICANT CHANGE UP (ref 8.4–10.5)
CHLORIDE SERPL-SCNC: 103 MMOL/L — SIGNIFICANT CHANGE UP (ref 96–108)
CO2 SERPL-SCNC: 21 MMOL/L — LOW (ref 22–31)
CREAT SERPL-MCNC: 0.88 MG/DL — SIGNIFICANT CHANGE UP (ref 0.5–1.3)
EGFR: 91 ML/MIN/1.73M2 — SIGNIFICANT CHANGE UP
EGFR: 91 ML/MIN/1.73M2 — SIGNIFICANT CHANGE UP
GLUCOSE SERPL-MCNC: 106 MG/DL — HIGH (ref 70–99)
HCT VFR BLD CALC: 37.9 % — LOW (ref 39–50)
HGB BLD-MCNC: 12.9 G/DL — LOW (ref 13–17)
MAGNESIUM SERPL-MCNC: 2.1 MG/DL — SIGNIFICANT CHANGE UP (ref 1.6–2.6)
MCHC RBC-ENTMCNC: 31.5 PG — SIGNIFICANT CHANGE UP (ref 27–34)
MCHC RBC-ENTMCNC: 34 G/DL — SIGNIFICANT CHANGE UP (ref 32–36)
MCV RBC AUTO: 92.4 FL — SIGNIFICANT CHANGE UP (ref 80–100)
NRBC BLD AUTO-RTO: 0 /100 WBCS — SIGNIFICANT CHANGE UP (ref 0–0)
PLATELET # BLD AUTO: 222 K/UL — SIGNIFICANT CHANGE UP (ref 150–400)
POTASSIUM SERPL-MCNC: 4.1 MMOL/L — SIGNIFICANT CHANGE UP (ref 3.5–5.3)
POTASSIUM SERPL-SCNC: 4.1 MMOL/L — SIGNIFICANT CHANGE UP (ref 3.5–5.3)
RBC # BLD: 4.1 M/UL — LOW (ref 4.2–5.8)
RBC # FLD: 12.1 % — SIGNIFICANT CHANGE UP (ref 10.3–14.5)
RH IG SCN BLD-IMP: POSITIVE — SIGNIFICANT CHANGE UP
RH IG SCN BLD-IMP: POSITIVE — SIGNIFICANT CHANGE UP
SODIUM SERPL-SCNC: 137 MMOL/L — SIGNIFICANT CHANGE UP (ref 135–145)
WBC # BLD: 6.17 K/UL — SIGNIFICANT CHANGE UP (ref 3.8–10.5)
WBC # FLD AUTO: 6.17 K/UL — SIGNIFICANT CHANGE UP (ref 3.8–10.5)

## 2025-06-02 PROCEDURE — 33285 INSJ SUBQ CAR RHYTHM MNTR: CPT

## 2025-06-02 PROCEDURE — 93619 COMPREHENSIVE EP EVALUATION: CPT

## 2025-06-02 PROCEDURE — 80048 BASIC METABOLIC PNL TOTAL CA: CPT

## 2025-06-02 PROCEDURE — C1730: CPT

## 2025-06-02 PROCEDURE — 83735 ASSAY OF MAGNESIUM: CPT

## 2025-06-02 PROCEDURE — 86850 RBC ANTIBODY SCREEN: CPT

## 2025-06-02 PROCEDURE — 93010 ELECTROCARDIOGRAM REPORT: CPT

## 2025-06-02 PROCEDURE — C1894: CPT

## 2025-06-02 PROCEDURE — 93619 COMPREHENSIVE EP EVALUATION: CPT | Mod: 26

## 2025-06-02 PROCEDURE — 86901 BLOOD TYPING SEROLOGIC RH(D): CPT

## 2025-06-02 PROCEDURE — C1764: CPT

## 2025-06-02 PROCEDURE — 93005 ELECTROCARDIOGRAM TRACING: CPT

## 2025-06-02 PROCEDURE — 86900 BLOOD TYPING SEROLOGIC ABO: CPT

## 2025-06-02 PROCEDURE — 85027 COMPLETE CBC AUTOMATED: CPT

## 2025-06-02 PROCEDURE — 99152 MOD SED SAME PHYS/QHP 5/>YRS: CPT

## 2025-06-02 RX ORDER — ROSUVASTATIN CALCIUM 20 MG/1
1 TABLET, FILM COATED ORAL
Refills: 0 | DISCHARGE

## 2025-06-02 NOTE — ASU DISCHARGE PLAN (ADULT/PEDIATRIC) - CARE PROVIDER_API CALL
Rickie Kirby.  Cardiac Electrophysiology  [address] [place] [id number]  Phone: [phone]  Fax: [phone]  Follow Up Time: 1-3 days

## 2025-06-02 NOTE — H&P CARDIOLOGY - NSICDXPASTMEDICALHX_GEN_ALL_CORE_FT
PAST MEDICAL HISTORY:  History of BPH     HLD (hyperlipidemia)     HTN (hypertension)     Syncope

## 2025-06-02 NOTE — ASU DISCHARGE PLAN (ADULT/PEDIATRIC) - ASU DC SPECIAL INSTRUCTIONSFT
WOUND CARE:  Do NOT scrub, rub, or pick at your incision site  the glue will wear off in 5-7 days  do not get your incision wet for 3 days   AFTER 3 days you may shower:   - use mild soap and gentle warm stream, pat dry with towel  DO NOT apply lotions, powders, ointment, or perfumes to incision  wear loose clothing around incision for 7 days  f/u appointment as instructed     ACTIVITY:   resume normal activities    Follow heart healthy diet recommended by your doctor, , if you smoke STOP SMOKING ( may call [phone] for center of tobacco control if you need assistance)     ***CALL YOUR DOCTOR***  if you experience: fever, chills, body aches, or severe pain, swelling, redness, heat or yellow discharge at incision site  If you are unable to reach your doctor, you may contact Cardiology Office at [facility] at [phone]

## 2025-06-02 NOTE — ASU DISCHARGE PLAN (ADULT/PEDIATRIC) - FINANCIAL ASSISTANCE
[facility] provides services at a reduced cost to those who are determined to be eligible through [facility]’s financial assistance program. Information regarding [facility]’s financial assistance program can be found by going to [email or web address] or by calling [phone].

## 2025-06-02 NOTE — H&P CARDIOLOGY - HISTORY OF PRESENT ILLNESS
74-year-old male with PMHx of  benign tremor, BPH, HTN, HLD, CAD, multiple episodes of syncope now presents for EP study  with possible ILR/PPM  implant. He was watching the TV and got a cramp in his legs, and started to walk around. He leaned over to piece of furniture for it to resolve and he just went down. She was there and he had no recollection of what happened. There was no dizziness. He felt like he needed air so opened the back door but fell the second time face to concrete stairs. Taken to [facility]. He was doing squat thrusts that morning was the only difference. She said he does intense normal regular workout CAD s/p syncope. He was watching the [employer] and got a cramp in left abductor and started to walk around. He leaned over to piece of furniture for it to resolve and he just went down. She was there and he had no recollection of what happened. There was no dizziness. He felt like he needed air so opened the back door but fell the second time face to concrete stairs. Taken to [facility]. He was doing squat thrusts that morning was the only difference. She said he does intense normal regular workout 74-year-old male, daily marijuana smoker with PMHx of  benign tremor, BPH, HTN, HLD, CAD, multiple episodes of syncope now presents for EP study  with possible ILR/PPM implant. Pt reports he was watching the TV and got a cramp in his legs, and started to walk around. He leaned over to piece of furniture for it to resolve and he just went down hitting his face on the concrete. Patient had no recollection of what happened. He was seen by Dr. Nielsen for cardiology evaluation who referred form  EP study.   Pt had breakfast at 5 AM.

## 2025-06-02 NOTE — PRE-ANESTHESIA EVALUATION ADULT - NSANTHOSAYNRD_GEN_A_CORE
No. LALY screening performed.  STOP BANG Legend: 0-2 = LOW Risk; 3-4 = INTERMEDIATE Risk; 5-8 = HIGH Risk
2 Hour(s) 39 Minute(s)

## 2025-06-03 ENCOUNTER — NON-APPOINTMENT (OUTPATIENT)
Age: 75
End: 2025-06-03

## 2025-06-03 ENCOUNTER — TRANSCRIPTION ENCOUNTER (OUTPATIENT)
Age: 75
End: 2025-06-03

## 2025-06-18 ENCOUNTER — NON-APPOINTMENT (OUTPATIENT)
Age: 75
End: 2025-06-18

## 2025-06-18 ENCOUNTER — APPOINTMENT (OUTPATIENT)
Dept: ELECTROPHYSIOLOGY | Facility: CLINIC | Age: 75
End: 2025-06-18

## 2025-06-18 VITALS
OXYGEN SATURATION: 99 % | SYSTOLIC BLOOD PRESSURE: 127 MMHG | WEIGHT: 167 LBS | DIASTOLIC BLOOD PRESSURE: 74 MMHG | BODY MASS INDEX: 24.66 KG/M2 | HEART RATE: 66 BPM

## 2025-06-18 PROCEDURE — 99212 OFFICE O/P EST SF 10 MIN: CPT

## 2025-06-18 PROCEDURE — 93291 INTERROG DEV EVAL SCRMS IP: CPT

## 2025-06-18 PROCEDURE — 93000 ELECTROCARDIOGRAM COMPLETE: CPT | Mod: 59

## 2025-07-07 RX ORDER — APIXABAN 5 MG/1
5 TABLET, FILM COATED ORAL
Qty: 180 | Refills: 0 | Status: ACTIVE | COMMUNITY
Start: 2025-07-07 | End: 2025-10-05

## 2025-07-23 ENCOUNTER — APPOINTMENT (OUTPATIENT)
Dept: ELECTROPHYSIOLOGY | Facility: CLINIC | Age: 75
End: 2025-07-23
Payer: MEDICARE

## 2025-07-23 ENCOUNTER — NON-APPOINTMENT (OUTPATIENT)
Age: 75
End: 2025-07-23

## 2025-07-23 PROCEDURE — 93298 REM INTERROG DEV EVAL SCRMS: CPT

## 2025-07-29 ENCOUNTER — TRANSCRIPTION ENCOUNTER (OUTPATIENT)
Age: 75
End: 2025-07-29

## 2025-07-30 ENCOUNTER — APPOINTMENT (OUTPATIENT)
Dept: INTERNAL MEDICINE | Facility: CLINIC | Age: 75
End: 2025-07-30
Payer: MEDICARE

## 2025-07-30 VITALS — OXYGEN SATURATION: 98 % | WEIGHT: 164 LBS | BODY MASS INDEX: 24.29 KG/M2 | HEIGHT: 69 IN | HEART RATE: 64 BPM

## 2025-07-30 VITALS — SYSTOLIC BLOOD PRESSURE: 132 MMHG | DIASTOLIC BLOOD PRESSURE: 68 MMHG

## 2025-07-30 DIAGNOSIS — K86.1 OTHER CHRONIC PANCREATITIS: ICD-10-CM

## 2025-07-30 DIAGNOSIS — S02.85XA FX OF ORBIT, UNSP, INT ENC FOR CL: ICD-10-CM

## 2025-07-30 PROCEDURE — 99214 OFFICE O/P EST MOD 30 MIN: CPT

## 2025-07-30 PROCEDURE — 36415 COLL VENOUS BLD VENIPUNCTURE: CPT

## 2025-07-30 PROCEDURE — G2211 COMPLEX E/M VISIT ADD ON: CPT

## 2025-07-30 RX ORDER — MUPIROCIN 20 MG/G
2 OINTMENT TOPICAL
Qty: 1 | Refills: 2 | Status: ACTIVE | COMMUNITY
Start: 2025-07-30 | End: 1900-01-01

## 2025-07-31 LAB
BASOPHILS # BLD AUTO: 0.04 K/UL
BASOPHILS NFR BLD AUTO: 0.7 %
EOSINOPHIL # BLD AUTO: 0.15 K/UL
EOSINOPHIL NFR BLD AUTO: 2.7 %
ERYTHROCYTE [SEDIMENTATION RATE] IN BLOOD BY WESTERGREN METHOD: 10 MM/HR
HCT VFR BLD CALC: 38.8 %
HGB BLD-MCNC: 13 G/DL
IMM GRANULOCYTES NFR BLD AUTO: 0.4 %
LYMPHOCYTES # BLD AUTO: 1.6 K/UL
LYMPHOCYTES NFR BLD AUTO: 28.8 %
MAN DIFF?: NORMAL
MCHC RBC-ENTMCNC: 31.6 PG
MCHC RBC-ENTMCNC: 33.5 G/DL
MCV RBC AUTO: 94.2 FL
MONOCYTES # BLD AUTO: 0.54 K/UL
MONOCYTES NFR BLD AUTO: 9.7 %
NEUTROPHILS # BLD AUTO: 3.21 K/UL
NEUTROPHILS NFR BLD AUTO: 57.7 %
PLATELET # BLD AUTO: 226 K/UL
RBC # BLD: 4.12 M/UL
RBC # FLD: 12.2 %
WBC # FLD AUTO: 5.56 K/UL

## 2025-08-06 ENCOUNTER — OUTPATIENT (OUTPATIENT)
Dept: OUTPATIENT SERVICES | Facility: HOSPITAL | Age: 75
LOS: 1 days | End: 2025-08-06
Payer: MEDICARE

## 2025-08-06 ENCOUNTER — APPOINTMENT (OUTPATIENT)
Dept: RADIOLOGY | Facility: CLINIC | Age: 75
End: 2025-08-06
Payer: MEDICARE

## 2025-08-06 DIAGNOSIS — Z00.00 ENCOUNTER FOR GENERAL ADULT MEDICAL EXAMINATION WITHOUT ABNORMAL FINDINGS: ICD-10-CM

## 2025-08-06 PROCEDURE — 73620 X-RAY EXAM OF FOOT: CPT | Mod: 26,RT

## 2025-08-06 PROCEDURE — 73620 X-RAY EXAM OF FOOT: CPT

## 2025-08-07 ENCOUNTER — NON-APPOINTMENT (OUTPATIENT)
Age: 75
End: 2025-08-07

## 2025-08-12 ENCOUNTER — APPOINTMENT (OUTPATIENT)
Dept: INTERNAL MEDICINE | Facility: CLINIC | Age: 75
End: 2025-08-12
Payer: MEDICARE

## 2025-08-12 VITALS — DIASTOLIC BLOOD PRESSURE: 74 MMHG | SYSTOLIC BLOOD PRESSURE: 124 MMHG

## 2025-08-12 VITALS
DIASTOLIC BLOOD PRESSURE: 60 MMHG | HEART RATE: 71 BPM | OXYGEN SATURATION: 96 % | WEIGHT: 166 LBS | HEIGHT: 69 IN | BODY MASS INDEX: 24.59 KG/M2 | SYSTOLIC BLOOD PRESSURE: 128 MMHG

## 2025-08-12 DIAGNOSIS — I48.0 PAROXYSMAL ATRIAL FIBRILLATION: ICD-10-CM

## 2025-08-12 DIAGNOSIS — S91.309A UNSPECIFIED OPEN WOUND, UNSPECIFIED FOOT, INITIAL ENCOUNTER: ICD-10-CM

## 2025-08-12 DIAGNOSIS — Z95.5 PRESENCE OF CORONARY ANGIOPLASTY IMPLANT AND GRAFT: ICD-10-CM

## 2025-08-12 DIAGNOSIS — I10 ESSENTIAL (PRIMARY) HYPERTENSION: ICD-10-CM

## 2025-08-12 DIAGNOSIS — R55 SYNCOPE AND COLLAPSE: ICD-10-CM

## 2025-08-12 PROCEDURE — 99214 OFFICE O/P EST MOD 30 MIN: CPT

## 2025-08-12 PROCEDURE — G2211 COMPLEX E/M VISIT ADD ON: CPT

## 2025-08-27 ENCOUNTER — NON-APPOINTMENT (OUTPATIENT)
Age: 75
End: 2025-08-27

## 2025-08-27 ENCOUNTER — APPOINTMENT (OUTPATIENT)
Dept: ELECTROPHYSIOLOGY | Facility: CLINIC | Age: 75
End: 2025-08-27
Payer: MEDICARE

## 2025-08-27 PROCEDURE — 93298 REM INTERROG DEV EVAL SCRMS: CPT

## 2025-09-08 ENCOUNTER — RX RENEWAL (OUTPATIENT)
Age: 75
End: 2025-09-08

## (undated) DEVICE — SENSOR O2 FINGER ADULT

## (undated) DEVICE — BIOPSY FORCEP RADIAL JAW 4 STANDARD WITH NEEDLE

## (undated) DEVICE — CATH IV SAFE BC 20G X 1.16" (PINK)

## (undated) DEVICE — PACK IV START WITH CHG

## (undated) DEVICE — BITE BLOCK ADULT 20 X 27MM (GREEN)

## (undated) DEVICE — FOLEY HOLDER STATLOCK 2 WAY ADULT

## (undated) DEVICE — CATH IV SAFE BC 22G X 1" (BLUE)

## (undated) DEVICE — TUBING SUCTION 20FT

## (undated) DEVICE — SUCTION YANKAUER NO CONTROL VENT

## (undated) DEVICE — ELCTR GROUNDING PAD ADULT COVIDIEN

## (undated) DEVICE — SNARE CAPTIVATOR COLD RND STIFF 10X2.4X2.8MM 240CM

## (undated) DEVICE — POLY TRAP ETRAP

## (undated) DEVICE — SOL INJ NS 0.9% 500ML 2 PORT

## (undated) DEVICE — BALLOON US ENDO

## (undated) DEVICE — TUBING IV SET GRAVITY 3Y 100" MACRO

## (undated) DEVICE — SYR LUER LOK 50CC

## (undated) DEVICE — BRUSH COLONOSCOPY CYTOLOGY

## (undated) DEVICE — FORCEP RADIAL JAW 4 JUMBO 2.8MM 3.2MM 240CM ORANGE DISP

## (undated) DEVICE — SYR ALLIANCE II INFLATION 60ML

## (undated) DEVICE — TUBING SUCTION CONN 6FT STERILE

## (undated) DEVICE — IRRIGATOR BIO SHIELD

## (undated) DEVICE — CLAMP BX HOT RAD JAW 3